# Patient Record
Sex: MALE | Race: OTHER | NOT HISPANIC OR LATINO
[De-identification: names, ages, dates, MRNs, and addresses within clinical notes are randomized per-mention and may not be internally consistent; named-entity substitution may affect disease eponyms.]

---

## 2017-05-01 ENCOUNTER — APPOINTMENT (OUTPATIENT)
Dept: ORTHOPEDIC SURGERY | Facility: CLINIC | Age: 82
End: 2017-05-01

## 2017-09-18 ENCOUNTER — APPOINTMENT (OUTPATIENT)
Dept: ORTHOPEDIC SURGERY | Facility: CLINIC | Age: 82
End: 2017-09-18
Payer: MEDICARE

## 2017-09-18 PROCEDURE — 99213 OFFICE O/P EST LOW 20 MIN: CPT

## 2017-10-07 ENCOUNTER — INPATIENT (INPATIENT)
Facility: HOSPITAL | Age: 82
LOS: 4 days | Discharge: HOME | DRG: 292 | End: 2017-10-12
Attending: INTERNAL MEDICINE | Admitting: INTERNAL MEDICINE
Payer: MEDICARE

## 2017-10-07 VITALS
TEMPERATURE: 98 F | HEART RATE: 77 BPM | RESPIRATION RATE: 20 BRPM | WEIGHT: 134.04 LBS | OXYGEN SATURATION: 96 % | SYSTOLIC BLOOD PRESSURE: 161 MMHG | DIASTOLIC BLOOD PRESSURE: 89 MMHG

## 2017-10-07 DIAGNOSIS — Z98.89 OTHER SPECIFIED POSTPROCEDURAL STATES: Chronic | ICD-10-CM

## 2017-10-07 DIAGNOSIS — I25.10 ATHEROSCLEROTIC HEART DISEASE OF NATIVE CORONARY ARTERY WITHOUT ANGINA PECTORIS: ICD-10-CM

## 2017-10-07 DIAGNOSIS — I50.23 ACUTE ON CHRONIC SYSTOLIC (CONGESTIVE) HEART FAILURE: ICD-10-CM

## 2017-10-07 DIAGNOSIS — I10 ESSENTIAL (PRIMARY) HYPERTENSION: ICD-10-CM

## 2017-10-07 DIAGNOSIS — I50.9 HEART FAILURE, UNSPECIFIED: ICD-10-CM

## 2017-10-07 DIAGNOSIS — I48.2 CHRONIC ATRIAL FIBRILLATION: ICD-10-CM

## 2017-10-07 DIAGNOSIS — E78.5 HYPERLIPIDEMIA, UNSPECIFIED: ICD-10-CM

## 2017-10-07 LAB
ALBUMIN SERPL ELPH-MCNC: 4 G/DL — SIGNIFICANT CHANGE UP (ref 3.3–5)
ALP SERPL-CCNC: 49 U/L — SIGNIFICANT CHANGE UP (ref 40–120)
ALT FLD-CCNC: 27 U/L RC — SIGNIFICANT CHANGE UP (ref 10–45)
ANION GAP SERPL CALC-SCNC: 11 MMOL/L — SIGNIFICANT CHANGE UP (ref 5–17)
APTT BLD: 28.6 SEC — SIGNIFICANT CHANGE UP (ref 27.5–37.4)
AST SERPL-CCNC: 28 U/L — SIGNIFICANT CHANGE UP (ref 10–40)
BASOPHILS # BLD AUTO: 0 K/UL — SIGNIFICANT CHANGE UP (ref 0–0.2)
BASOPHILS NFR BLD AUTO: 0.4 % — SIGNIFICANT CHANGE UP (ref 0–2)
BILIRUB SERPL-MCNC: 0.3 MG/DL — SIGNIFICANT CHANGE UP (ref 0.2–1.2)
BUN SERPL-MCNC: 22 MG/DL — SIGNIFICANT CHANGE UP (ref 7–23)
CALCIUM SERPL-MCNC: 9.4 MG/DL — SIGNIFICANT CHANGE UP (ref 8.4–10.5)
CHLORIDE SERPL-SCNC: 98 MMOL/L — SIGNIFICANT CHANGE UP (ref 96–108)
CK MB CFR SERPL CALC: 4.2 NG/ML — SIGNIFICANT CHANGE UP (ref 0–6.7)
CO2 SERPL-SCNC: 28 MMOL/L — SIGNIFICANT CHANGE UP (ref 22–31)
CREAT SERPL-MCNC: 1.03 MG/DL — SIGNIFICANT CHANGE UP (ref 0.5–1.3)
EOSINOPHIL # BLD AUTO: 0.4 K/UL — SIGNIFICANT CHANGE UP (ref 0–0.5)
EOSINOPHIL NFR BLD AUTO: 4.2 % — SIGNIFICANT CHANGE UP (ref 0–6)
GAS PNL BLDV: SIGNIFICANT CHANGE UP
GLUCOSE SERPL-MCNC: 148 MG/DL — HIGH (ref 70–99)
HCT VFR BLD CALC: 41.1 % — SIGNIFICANT CHANGE UP (ref 39–50)
HGB BLD-MCNC: 14.6 G/DL — SIGNIFICANT CHANGE UP (ref 13–17)
INR BLD: 1.19 RATIO — HIGH (ref 0.88–1.16)
LYMPHOCYTES # BLD AUTO: 2.8 K/UL — SIGNIFICANT CHANGE UP (ref 1–3.3)
LYMPHOCYTES # BLD AUTO: 33.4 % — SIGNIFICANT CHANGE UP (ref 13–44)
MCHC RBC-ENTMCNC: 32.1 PG — SIGNIFICANT CHANGE UP (ref 27–34)
MCHC RBC-ENTMCNC: 35.4 GM/DL — SIGNIFICANT CHANGE UP (ref 32–36)
MCV RBC AUTO: 90.5 FL — SIGNIFICANT CHANGE UP (ref 80–100)
MONOCYTES # BLD AUTO: 0.8 K/UL — SIGNIFICANT CHANGE UP (ref 0–0.9)
MONOCYTES NFR BLD AUTO: 9.2 % — SIGNIFICANT CHANGE UP (ref 2–14)
NEUTROPHILS # BLD AUTO: 4.5 K/UL — SIGNIFICANT CHANGE UP (ref 1.8–7.4)
NEUTROPHILS NFR BLD AUTO: 52.8 % — SIGNIFICANT CHANGE UP (ref 43–77)
NT-PROBNP SERPL-SCNC: 2452 PG/ML — HIGH (ref 0–300)
PLATELET # BLD AUTO: 229 K/UL — SIGNIFICANT CHANGE UP (ref 150–400)
POTASSIUM SERPL-MCNC: 4.1 MMOL/L — SIGNIFICANT CHANGE UP (ref 3.5–5.3)
POTASSIUM SERPL-SCNC: 4.1 MMOL/L — SIGNIFICANT CHANGE UP (ref 3.5–5.3)
PROT SERPL-MCNC: 7.5 G/DL — SIGNIFICANT CHANGE UP (ref 6–8.3)
PROTHROM AB SERPL-ACNC: 12.9 SEC — HIGH (ref 9.8–12.7)
RBC # BLD: 4.54 M/UL — SIGNIFICANT CHANGE UP (ref 4.2–5.8)
RBC # FLD: 11.6 % — SIGNIFICANT CHANGE UP (ref 10.3–14.5)
SODIUM SERPL-SCNC: 137 MMOL/L — SIGNIFICANT CHANGE UP (ref 135–145)
TROPONIN T SERPL-MCNC: <0.01 NG/ML — SIGNIFICANT CHANGE UP (ref 0–0.06)
WBC # BLD: 8.5 K/UL — SIGNIFICANT CHANGE UP (ref 3.8–10.5)
WBC # FLD AUTO: 8.5 K/UL — SIGNIFICANT CHANGE UP (ref 3.8–10.5)

## 2017-10-07 PROCEDURE — 93010 ELECTROCARDIOGRAM REPORT: CPT

## 2017-10-07 PROCEDURE — 99285 EMERGENCY DEPT VISIT HI MDM: CPT | Mod: 25

## 2017-10-07 PROCEDURE — 71010: CPT | Mod: 26

## 2017-10-07 RX ORDER — ASPIRIN/CALCIUM CARB/MAGNESIUM 324 MG
81 TABLET ORAL DAILY
Qty: 0 | Refills: 0 | Status: DISCONTINUED | OUTPATIENT
Start: 2017-10-07 | End: 2017-10-12

## 2017-10-07 RX ORDER — AMLODIPINE BESYLATE 2.5 MG/1
10 TABLET ORAL DAILY
Qty: 0 | Refills: 0 | Status: DISCONTINUED | OUTPATIENT
Start: 2017-10-07 | End: 2017-10-12

## 2017-10-07 RX ORDER — FUROSEMIDE 40 MG
20 TABLET ORAL ONCE
Qty: 0 | Refills: 0 | Status: COMPLETED | OUTPATIENT
Start: 2017-10-07 | End: 2017-10-07

## 2017-10-07 RX ORDER — ENOXAPARIN SODIUM 100 MG/ML
60 INJECTION SUBCUTANEOUS
Qty: 0 | Refills: 0 | Status: DISCONTINUED | OUTPATIENT
Start: 2017-10-07 | End: 2017-10-11

## 2017-10-07 RX ORDER — LISINOPRIL 2.5 MG/1
10 TABLET ORAL DAILY
Qty: 0 | Refills: 0 | Status: DISCONTINUED | OUTPATIENT
Start: 2017-10-07 | End: 2017-10-12

## 2017-10-07 RX ORDER — ASPIRIN/CALCIUM CARB/MAGNESIUM 324 MG
1 TABLET ORAL
Qty: 0 | Refills: 0 | COMMUNITY

## 2017-10-07 RX ORDER — LISINOPRIL 2.5 MG/1
1 TABLET ORAL
Qty: 0 | Refills: 0 | COMMUNITY

## 2017-10-07 RX ORDER — MONTELUKAST 4 MG/1
1 TABLET, CHEWABLE ORAL
Qty: 0 | Refills: 0 | COMMUNITY

## 2017-10-07 RX ORDER — METOPROLOL TARTRATE 50 MG
50 TABLET ORAL
Qty: 0 | Refills: 0 | Status: DISCONTINUED | OUTPATIENT
Start: 2017-10-07 | End: 2017-10-12

## 2017-10-07 RX ORDER — ALBUTEROL 90 UG/1
2 AEROSOL, METERED ORAL
Qty: 0 | Refills: 0 | COMMUNITY

## 2017-10-07 RX ORDER — METOPROLOL TARTRATE 50 MG
1 TABLET ORAL
Qty: 0 | Refills: 0 | COMMUNITY

## 2017-10-07 RX ORDER — POLYETHYLENE GLYCOL 3350 17 G/17G
17 POWDER, FOR SOLUTION ORAL
Qty: 0 | Refills: 0 | COMMUNITY

## 2017-10-07 RX ORDER — INFLUENZA VIRUS VACCINE 15; 15; 15; 15 UG/.5ML; UG/.5ML; UG/.5ML; UG/.5ML
0.5 SUSPENSION INTRAMUSCULAR ONCE
Qty: 0 | Refills: 0 | Status: DISCONTINUED | OUTPATIENT
Start: 2017-10-07 | End: 2017-10-12

## 2017-10-07 RX ORDER — AMLODIPINE BESYLATE 2.5 MG/1
1 TABLET ORAL
Qty: 0 | Refills: 0 | COMMUNITY

## 2017-10-07 RX ORDER — CHOLECALCIFEROL (VITAMIN D3) 125 MCG
1 CAPSULE ORAL
Qty: 0 | Refills: 0 | COMMUNITY

## 2017-10-07 RX ORDER — MELOXICAM 15 MG/1
1 TABLET ORAL
Qty: 0 | Refills: 0 | COMMUNITY

## 2017-10-07 RX ORDER — TETRAHYDROZOLINE/POLYETHYL GLY 0.05 %-1 %
1 DROPS OPHTHALMIC (EYE)
Qty: 0 | Refills: 0 | COMMUNITY

## 2017-10-07 RX ADMIN — ENOXAPARIN SODIUM 60 MILLIGRAM(S): 100 INJECTION SUBCUTANEOUS at 22:01

## 2017-10-07 RX ADMIN — Medication 50 MILLIGRAM(S): at 22:01

## 2017-10-07 RX ADMIN — Medication 20 MILLIGRAM(S): at 17:27

## 2017-10-07 RX ADMIN — Medication 20 MILLIGRAM(S): at 16:34

## 2017-10-07 NOTE — ED PROVIDER NOTE - ATTENDING CONTRIBUTION TO CARE
Dr. Robles : I have personally seen and examined this patient at the bedside. I have fully participated in the care of this patient. I have reviewed all pertinent clinical information, including history, physical exam, plan and the Resident's note and agree except as noted.   83yo M hx of CHF, htn mi cad p/w bl le swelling, sob worsening over last 4 days. notes that usually is active walks for 45-1hr daily and could not do it over last few days as gets sob.   Denies f/c/n/v/ cp/ palpitations/abd.pain/d/c/dysuria/hematuria. no sick contacts/recent travel. +orthopnea    PE:  head; atraumatic normocephalic  eyes: perrla  Heart: rrr s1s2  lungs: ctab  abd: soft, nt nd + bs no rebound/guarding no cva ttp  le: 1+edema bl no calf ttp  back: no midline cervical/thoracic/lumbar ttp    -->most likely chf exacerbation vs acs vs pna will fu labs cxr; tx with lasix--admit

## 2017-10-07 NOTE — H&P ADULT - PROBLEM SELECTOR PLAN 1
S/P IV Lasix 60 mg and will start 40mg Iv BID . Rpting Echo as last echo showed  EF 34% ;< from: Transthoracic Echocardiogram (08.28.16 @ 18:16) >  1. Mild mitral regurgitation.  2. Calcified trileaflet aortic valve with normal opening.  Mild aortic regurgitation.  3. Endocardium not well visualized; moderately  reduced  left ventricular systolic function, however, endocardial  border definition is limited, and segmental wall-motion  abnormalities assessment is limited.Consider further  limited evaluation with echo contrast Definity for  assessment of segmental wall motion if clinically  indicated.  4. The right ventricle is not well visualized; grossly  normal right ventricular systolic function.  5. Estimated pulmonary artery systolic pressure equals 35  mm Hg, assuming right atrial pressure equals 8 mm Hg,  consistent with borderline pulmonary pressures.    Cardiology consulted.

## 2017-10-07 NOTE — ED ADULT NURSE NOTE - OBJECTIVE STATEMENT
as per son, patient was sent to ER by his doctor for increasing shortness of breath on exertion and BLE edema. At rest patient appears mildly short of breath. Abdomen appears distended which patient states is not normal for him. Denies any chest pain, cough, fever or chills. Reports he takes lasix 40 mg daily and was instructed to double his dose by his doctor but has not had improvement of symptoms even though he has been urinating large amounts.

## 2017-10-07 NOTE — H&P ADULT - PROBLEM SELECTOR PLAN 2
Stopped taking Eliquis as was making him sick but no bleeding. Will change to Xarelto . Echo ordered . EP consulted.

## 2017-10-07 NOTE — ED PROVIDER NOTE - CARDIAC, MLM
Normal rate, regular rhythm.  Heart sounds S1, S2.  No murmurs, rubs or gallops. 1+ pitting edema b/l lower extremities.

## 2017-10-07 NOTE — ED PROVIDER NOTE - OBJECTIVE STATEMENT
83 y/o M pmhx CHF, HTN, HLD, LBBB, old MI, CAD, presenting sent in by his PMD for worsening shortness of breath. Pt states that he has been having worsening shortness of breath with exertion for the last week, today was severe to extent that he was unable to walk more than a few steps without being short of breath. Son states that he took him to his doctor and was told to bring him in for CHF exacerbation. Patient states that he has noted swelling in his legs bilaterally that is worsening for the last few days as well. Took 40mg of lasix this morning, normal dose is 20mg once daily. Reports this has not improved his symptoms. Pt denies any chest pain, fever, chills, nausea, vomiting, diarrhea, recent travel, recent sick contacts.

## 2017-10-07 NOTE — H&P ADULT - HISTORY OF PRESENT ILLNESS
81 y/o M pmhx CHF, HTN, HLD, LBBB, old MI, CAD, presenting sent in by his PMD for worsening shortness of breath. Pt states that he has been having worsening shortness of breath with exertion for the last week, today was severe to extent that he was unable to walk more than a few steps without being short of breath. Son states that he took him to his doctor and was told to bring him in for CHF exacerbation. Patient states that he has noted swelling in his legs bilaterally that is worsening for the last few days as well. Took 40mg of lasix this morning, normal dose is 20mg once daily. Reports this has not improved his symptoms. Pt denies any chest pain, fever, chills, nausea, vomiting, diarrhea, recent travel, recent sick contacts.

## 2017-10-07 NOTE — ED PROVIDER NOTE - MEDICAL DECISION MAKING DETAILS
81 y/o M significant cardiac hsitory with h/o CHF presenting with worsening HENAO x1 week, now with b/l LE edema, sent in by PMD for likely CHF exacerbation. PE remarkable for wheezing and LE edema. Will obtain labs, pro-BNP, CXR, provide lasix and TBA for CHF

## 2017-10-07 NOTE — H&P ADULT - FAMILY HISTORY
Sibling  Still living? Unknown  Family history of acute myocardial infarction, Age at diagnosis: Age Unknown

## 2017-10-07 NOTE — ED ADULT NURSE NOTE - PMH
Acid reflux    CHF (congestive heart failure)    Coronary artery disease involving native coronary artery of native heart, angina presence unspecified    HTN (hypertension)    Hyperlipidemia, unspecified hyperlipidemia type    Hypertension    Left bundle branch block    Localized, primary osteoarthritis  left knee  MI, old  questionable 1970's  Primary osteoarthritis, unspecified site    Uncomplicated asthma, unspecified asthma severity

## 2017-10-08 DIAGNOSIS — J45.909 UNSPECIFIED ASTHMA, UNCOMPLICATED: ICD-10-CM

## 2017-10-08 DIAGNOSIS — R06.02 SHORTNESS OF BREATH: ICD-10-CM

## 2017-10-08 LAB
ANION GAP SERPL CALC-SCNC: 13 MMOL/L — SIGNIFICANT CHANGE UP (ref 5–17)
BUN SERPL-MCNC: 17 MG/DL — SIGNIFICANT CHANGE UP (ref 7–23)
CALCIUM SERPL-MCNC: 9.4 MG/DL — SIGNIFICANT CHANGE UP (ref 8.4–10.5)
CHLORIDE SERPL-SCNC: 100 MMOL/L — SIGNIFICANT CHANGE UP (ref 96–108)
CO2 SERPL-SCNC: 27 MMOL/L — SIGNIFICANT CHANGE UP (ref 22–31)
CREAT SERPL-MCNC: 0.97 MG/DL — SIGNIFICANT CHANGE UP (ref 0.5–1.3)
GLUCOSE SERPL-MCNC: 116 MG/DL — HIGH (ref 70–99)
HCT VFR BLD CALC: 40 % — SIGNIFICANT CHANGE UP (ref 39–50)
HGB BLD-MCNC: 14.5 G/DL — SIGNIFICANT CHANGE UP (ref 13–17)
MCHC RBC-ENTMCNC: 30.9 PG — SIGNIFICANT CHANGE UP (ref 27–34)
MCHC RBC-ENTMCNC: 36.3 GM/DL — HIGH (ref 32–36)
MCV RBC AUTO: 85.3 FL — SIGNIFICANT CHANGE UP (ref 80–100)
PLATELET # BLD AUTO: 214 K/UL — SIGNIFICANT CHANGE UP (ref 150–400)
POTASSIUM SERPL-MCNC: 3.3 MMOL/L — LOW (ref 3.5–5.3)
POTASSIUM SERPL-SCNC: 3.3 MMOL/L — LOW (ref 3.5–5.3)
RBC # BLD: 4.69 M/UL — SIGNIFICANT CHANGE UP (ref 4.2–5.8)
RBC # FLD: 13.2 % — SIGNIFICANT CHANGE UP (ref 10.3–14.5)
SODIUM SERPL-SCNC: 140 MMOL/L — SIGNIFICANT CHANGE UP (ref 135–145)
WBC # BLD: 6.46 K/UL — SIGNIFICANT CHANGE UP (ref 3.8–10.5)
WBC # FLD AUTO: 6.46 K/UL — SIGNIFICANT CHANGE UP (ref 3.8–10.5)

## 2017-10-08 RX ORDER — FUROSEMIDE 40 MG
60 TABLET ORAL DAILY
Qty: 0 | Refills: 0 | Status: DISCONTINUED | OUTPATIENT
Start: 2017-10-08 | End: 2017-10-12

## 2017-10-08 RX ORDER — MONTELUKAST 4 MG/1
10 TABLET, CHEWABLE ORAL DAILY
Qty: 0 | Refills: 0 | Status: DISCONTINUED | OUTPATIENT
Start: 2017-10-08 | End: 2017-10-12

## 2017-10-08 RX ORDER — SENNA PLUS 8.6 MG/1
2 TABLET ORAL AT BEDTIME
Qty: 0 | Refills: 0 | Status: DISCONTINUED | OUTPATIENT
Start: 2017-10-08 | End: 2017-10-12

## 2017-10-08 RX ORDER — POTASSIUM CHLORIDE 20 MEQ
40 PACKET (EA) ORAL EVERY 4 HOURS
Qty: 0 | Refills: 0 | Status: COMPLETED | OUTPATIENT
Start: 2017-10-08 | End: 2017-10-08

## 2017-10-08 RX ORDER — IPRATROPIUM/ALBUTEROL SULFATE 18-103MCG
3 AEROSOL WITH ADAPTER (GRAM) INHALATION EVERY 6 HOURS
Qty: 0 | Refills: 0 | Status: DISCONTINUED | OUTPATIENT
Start: 2017-10-08 | End: 2017-10-12

## 2017-10-08 RX ORDER — DOCUSATE SODIUM 100 MG
100 CAPSULE ORAL THREE TIMES A DAY
Qty: 0 | Refills: 0 | Status: DISCONTINUED | OUTPATIENT
Start: 2017-10-08 | End: 2017-10-12

## 2017-10-08 RX ORDER — BUDESONIDE AND FORMOTEROL FUMARATE DIHYDRATE 160; 4.5 UG/1; UG/1
2 AEROSOL RESPIRATORY (INHALATION)
Qty: 0 | Refills: 0 | Status: DISCONTINUED | OUTPATIENT
Start: 2017-10-08 | End: 2017-10-12

## 2017-10-08 RX ADMIN — SENNA PLUS 2 TABLET(S): 8.6 TABLET ORAL at 21:33

## 2017-10-08 RX ADMIN — Medication 100 MILLIGRAM(S): at 21:33

## 2017-10-08 RX ADMIN — Medication 60 MILLIGRAM(S): at 12:34

## 2017-10-08 RX ADMIN — Medication 40 MILLIEQUIVALENT(S): at 19:56

## 2017-10-08 RX ADMIN — LISINOPRIL 10 MILLIGRAM(S): 2.5 TABLET ORAL at 06:24

## 2017-10-08 RX ADMIN — Medication 3 MILLILITER(S): at 21:33

## 2017-10-08 RX ADMIN — ENOXAPARIN SODIUM 60 MILLIGRAM(S): 100 INJECTION SUBCUTANEOUS at 12:34

## 2017-10-08 RX ADMIN — Medication 81 MILLIGRAM(S): at 12:35

## 2017-10-08 RX ADMIN — Medication 40 MILLIEQUIVALENT(S): at 16:59

## 2017-10-08 RX ADMIN — ENOXAPARIN SODIUM 60 MILLIGRAM(S): 100 INJECTION SUBCUTANEOUS at 21:34

## 2017-10-08 RX ADMIN — Medication 50 MILLIGRAM(S): at 06:24

## 2017-10-08 RX ADMIN — AMLODIPINE BESYLATE 10 MILLIGRAM(S): 2.5 TABLET ORAL at 06:24

## 2017-10-08 RX ADMIN — BUDESONIDE AND FORMOTEROL FUMARATE DIHYDRATE 2 PUFF(S): 160; 4.5 AEROSOL RESPIRATORY (INHALATION) at 19:58

## 2017-10-08 RX ADMIN — MONTELUKAST 10 MILLIGRAM(S): 4 TABLET, CHEWABLE ORAL at 17:02

## 2017-10-08 RX ADMIN — Medication 50 MILLIGRAM(S): at 17:02

## 2017-10-08 NOTE — PROGRESS NOTE ADULT - SUBJECTIVE AND OBJECTIVE BOX
INTERVAL HPI/OVERNIGHT EVENTS: Feel better .   Vital Signs Last 24 Hrs  T(C): 36.4 (08 Oct 2017 04:26), Max: 37.1 (07 Oct 2017 21:48)  T(F): 97.5 (08 Oct 2017 04:26), Max: 98.8 (07 Oct 2017 21:48)  HR: 76 (08 Oct 2017 04:26) (66 - 82)  BP: 165/77 (08 Oct 2017 04:26) (147/84 - 166/88)  BP(mean): --  RR: 18 (08 Oct 2017 04:26) (18 - 20)  SpO2: 93% (08 Oct 2017 04:26) (93% - 97%)  I&O's Summary    07 Oct 2017 07:01  -  08 Oct 2017 07:00  --------------------------------------------------------  IN: 0 mL / OUT: 4500 mL / NET: -4500 mL      MEDICATIONS  (STANDING):  amLODIPine   Tablet 10 milliGRAM(s) Oral daily  aspirin enteric coated 81 milliGRAM(s) Oral daily  enoxaparin Injectable 60 milliGRAM(s) SubCutaneous two times a day  influenza   Vaccine 0.5 milliLiter(s) IntraMuscular once  lisinopril 10 milliGRAM(s) Oral daily  metoprolol 50 milliGRAM(s) Oral two times a day    MEDICATIONS  (PRN):    LABS:                        14.5   6.46  )-----------( 214      ( 08 Oct 2017 09:28 )             40.0     10-08    140  |  100  |  17  ----------------------------<  116<H>  3.3<L>   |  27  |  0.97    Ca    9.4      08 Oct 2017 09:19    TPro  7.5  /  Alb  4.0  /  TBili  0.3  /  DBili  x   /  AST  28  /  ALT  27  /  AlkPhos  49  10-07    PT/INR - ( 07 Oct 2017 16:25 )   PT: 12.9 sec;   INR: 1.19 ratio         PTT - ( 07 Oct 2017 16:25 )  PTT:28.6 sec    CAPILLARY BLOOD GLUCOSE              REVIEW OF SYSTEMS:  CONSTITUTIONAL: No fever, weight loss, or fatigue  EYES: No eye pain, visual disturbances, or discharge  ENMT:  No difficulty hearing, tinnitus, vertigo; No sinus or throat pain  NECK: No pain or stiffness  BREASTS: No pain, masses, or nipple discharge  RESPIRATORY: No cough, wheezing, chills or hemoptysis; No shortness of breath  CARDIOVASCULAR: No chest pain, palpitations, dizziness, or leg swelling  GASTROINTESTINAL: No abdominal or epigastric pain. No nausea, vomiting, or hematemesis; No diarrhea or constipation. No melena or hematochezia.  GENITOURINARY: No dysuria, frequency, hematuria, or incontinence  NEUROLOGICAL: No headaches, memory loss, loss of strength, numbness, or tremors  SKIN: No itching, burning, rashes, or lesions   LYMPH NODES: No enlarged glands  ENDOCRINE: No heat or cold intolerance; No hair loss  MUSCULOSKELETAL: No joint pain or swelling; No muscle, back, or extremity pain  PSYCHIATRIC: No depression, anxiety, mood swings, or difficulty sleeping  HEME/LYMPH: No easy bruising, or bleeding gums  ALLERY AND IMMUNOLOGIC: No hives or eczema    RADIOLOGY & ADDITIONAL TESTS:    Consultant(s) Notes Reviewed:  [x ] YES  [ ] NO    PHYSICAL EXAM:  GENERAL: NAD, well-groomed, well-developed, not in any distress ,  HEAD:  Atraumatic, Normocephalic  EYES: EOMI, PERRLA, conjunctiva and sclera clear  ENMT: No tonsillar erythema, exudates, or enlargement; Moist mucous membranes, Good dentition, No lesions  NECK: Supple, No JVD, Normal thyroid  NERVOUS SYSTEM:  Alert & Oriented X3, No focal deficit   CHEST/LUNG: Good air entry bilateral with  rales, rhonchi,   HEART: Iregular rate and rhythm; No murmurs, rubs, or gallops  ABDOMEN: Soft, Nontender, Nondistended; Bowel sounds present  EXTREMITIES:  2+ Peripheral Pulses, No clubbing, cyanosis, or +edema  SKIN: No rashes or lesions    Care Discussed with Consultants/Other Providers [ x] YES  [ ] NO

## 2017-10-08 NOTE — CONSULT NOTE ADULT - SUBJECTIVE AND OBJECTIVE BOX
I have seen and examined the patient and reviewed the history He says he has hx of asthma and was taking " rotahaler" in [place] He could not specify what kind of inhaler! He has been having SOB for past few days with increasing edema of legs .  Patient is a 82y old  Male who presents with a chief complaint of sob (07 Oct 2017 19:25)      HPI:  81 y/o M pmhx CHF, HTN, HLD, LBBB, old MI, CAD, presenting sent in by his PMD for worsening shortness of breath. Pt states that he has been having worsening shortness of breath with exertion for the last week, today was severe to extent that he was unable to walk more than a few steps without being short of breath. Son states that he took him to his doctor and was told to bring him in for CHF exacerbation. Patient states that he has noted swelling in his legs bilaterally that is worsening for the last few days as well. Took 40mg of lasix this morning, normal dose is 20mg once daily. Reports this has not improved his symptoms. Pt denies any chest pain, fever, chills, nausea, vomiting, diarrhea, recent travel, recent sick contacts. (07 Oct 2017 19:25)      ?FOLLOWING PRESENT  [x ] Hx of PE/DVT, [x] Hx COPD, [y ] Hx of Asthma, [ ?] Hx of Hospitalization, [x]  Hx of BiPAP/CPAP use, [ x] Hx of SERGEI    Allergies    No Known Allergies    Intolerances        PAST MEDICAL & SURGICAL HISTORY:  CHF (congestive heart failure)  Hyperlipidemia, unspecified hyperlipidemia type  Primary osteoarthritis, unspecified site  Uncomplicated asthma, unspecified asthma severity  Coronary artery disease involving native coronary artery of native heart, angina presence unspecified  Hypertension  Left bundle branch block  Localized, primary osteoarthritis: left knee  MI, old: questionable 1970&#x27;s  Acid reflux  HTN (hypertension)  H/O hernia repair  Hernia, umbilical: S/P repair 2003      FAMILY HISTORY:  Family history of acute myocardial infarction (Sibling)      Social History: [ x ] TOBACCO                  [ occ ] ETOH                                 [x  ] IVDA/DRUGS    REVIEW OF SYSTEMS      General:x	    Skin/Breast:x  	  Ophthalmologic:x  	  ENMT:	x    Respiratory and Thorax: sob  	  Cardiovascular:	x    Gastrointestinal:	x    Genitourinary:	x    Musculoskeletal:	 pedal edema     Neurological:	x    Psychiatric:	x    Hematology/Lymphatics:	x    Endocrine:	x    Allergic/Immunologic:	  x  MEDICATIONS  (STANDING):  amLODIPine   Tablet 10 milliGRAM(s) Oral daily  aspirin enteric coated 81 milliGRAM(s) Oral daily  enoxaparin Injectable 60 milliGRAM(s) SubCutaneous two times a day  furosemide   Injectable 60 milliGRAM(s) IV Push daily  influenza   Vaccine 0.5 milliLiter(s) IntraMuscular once  lisinopril 10 milliGRAM(s) Oral daily  metoprolol 50 milliGRAM(s) Oral two times a day    MEDICATIONS  (PRN):       Vital Signs Last 24 Hrs  T(C): 36.4 (08 Oct 2017 04:26), Max: 37.1 (07 Oct 2017 21:48)  T(F): 97.5 (08 Oct 2017 04:26), Max: 98.8 (07 Oct 2017 21:48)  HR: 76 (08 Oct 2017 04:26) (66 - 82)  BP: 165/77 (08 Oct 2017 04:26) (147/84 - 166/88)  BP(mean): --  RR: 18 (08 Oct 2017 04:26) (18 - 20)  SpO2: 93% (08 Oct 2017 04:26) (93% - 97%)        I&O's Summary    07 Oct 2017 07:01  -  08 Oct 2017 07:00  --------------------------------------------------------  IN: 0 mL / OUT: 4500 mL / NET: -4500 mL        Physical Exam:   GENERAL: NAD, well-groomed, well-developed  HEENT: TONY/   Atraumatic, Normocephalic  ENMT: No tonsillar erythema, exudates, or enlargement; Moist mucous membranes, Good dentition, No lesions  NECK: Supple, No JVD, Normal thyroid  CHEST/LUNG: half way coarse crackels onm left side right side is clean   CVS: Regular rate and rhythm; No murmurs, rubs, or gallops  GI: : Soft, Nontender, Nondistended; Bowel sounds present  NERVOUS SYSTEM:  Alert & Oriented X3  EXTREMITIES: mild edema  LYMPH: No lymphadenopathy noted  SKIN: No rashes or lesions  ENDOCRINOLOGY: No Thyromegaly  PSYCH: Appropriate    Labs:  3.8<54<4>>42<<7.365>>3.8<<3><<4><<5<<429>>  CARDIAC MARKERS ( 07 Oct 2017 16:25 )  x     / <0.01 ng/mL / x     / x     / 4.2 ng/mL                            14.5   6.46  )-----------( 214      ( 08 Oct 2017 09:28 )             40.0                         14.6   8.5   )-----------( 229      ( 07 Oct 2017 16:25 )             41.1     10-08    140  |  100  |  17  ----------------------------<  116<H>  3.3<L>   |  27  |  0.97  10-07    137  |  98  |  22  ----------------------------<  148<H>  4.1   |  28  |  1.03    Ca    9.4      08 Oct 2017 09:19  Ca    9.4      07 Oct 2017 16:25    TPro  7.5  /  Alb  4.0  /  TBili  0.3  /  DBili  x   /  AST  28  /  ALT  27  /  AlkPhos  49  10-07    CAPILLARY BLOOD GLUCOSE        LIVER FUNCTIONS - ( 07 Oct 2017 16:25 )  Alb: 4.0 g/dL / Pro: 7.5 g/dL / ALK PHOS: 49 U/L / ALT: 27 U/L RC / AST: 28 U/L / GGT: x           PT/INR - ( 07 Oct 2017 16:25 )   PT: 12.9 sec;   INR: 1.19 ratio         PTT - ( 07 Oct 2017 16:25 )  PTT:28.6 sec    D DImer  Serum Pro-Brain Natriuretic Peptide: 2452 pg/mL (10-07 @ 16:25)    Cultures:               < from: Xray Chest 1 View AP- PORTABLE-Urgent (10.07.17 @ 16:52) >      PROCEDURE DATE:  10/07/2017            INTERPRETATION:  CLINICAL INDICATION: Dyspnea on exertion, history of CHF.    EXAM: Frontal view of the chest with comparison made to chest radiograph   on 8/25/2016    FINDINGS:   Bibasilar opacities. Hilar vasculature is indistinct. No pneumothorax.  The heart size is slightly enlarged.  No acute bony pathology.    IMPRESSION:   Mild pulmonary edema with small bilateral pleural effusions.      < from: Xray Chest 2 Views PA/Lat (10.22.13 @ 14:35) >  CHEST PA LAT        PROCEDURE DATE:  Oct 22 2013       INTERPRETATION:  CLINICAL INDICATION: Preoperative x-rays    EXAM: PA and lateral radiographs of the chest.    COMPARISON: There are no similar prior studies available for comparison.     FINDINGS:  The lungs are grossly clear. There are no pleural effusions. There is no   evidence of pneumothorax.    The cardiac and mediastinal silhouettes are within normal limits.    There are degenerative changes of the spine.    IMPRESSION: No acute pulmonary disease.             TAE MCKINNON M.D., RESIDENT RADIOLOGIST  DUSTIN TONY M.D., ATTENDING RADIOLOGIST  This examination was interpreted on:  Oct 22 2013  3:17P.  This document   has been electronically signed. Oct 22 2013  3:21P.          < end of copied text >            ALLEN TONEY M.D., RADIOLOGY RESIDENT  This document has been electronically signed.  AGUSTIN RODAS M.D., ATTENDING RADIOLOGIST  This document has been electronically signed. Oct  8 2017  8:48AM        < end of copied text >              Studies  Chest X-RAY  CT SCAN Chest   CT Abdomen  Venous Dopplers: LE:   Others

## 2017-10-08 NOTE — CONSULT NOTE ADULT - SUBJECTIVE AND OBJECTIVE BOX
HISTORY OF PRESENT ILLNESS: HPI:  81 y/o M pmhx CHF, HTN, HLD, LBBB, old MI, CAD, Moderate LV dysfx, reportedly cathed 5 years ago in [place] and medical managment was suggested who now is sent in by his PMD for worsening shortness of breath. Pt states that he has been having worsening shortness of breath with exertion for the last week, today was severe to extent that he was unable to walk more than a few steps without being short of breath. Son states that he took him to his doctor and was told to bring him in for CHF exacerbation. Patient states that he has noted swelling in his legs bilaterally that is worsening for the last few days as well. Took 40mg of lasix this morning, normal dose is 20mg once daily. Reports this has not improved his symptoms. Pt denies any chest pain, fever, chills, nausea, vomiting, diarrhea, recent travel, recent sick contacts. (07 Oct 2017 19:25) Does admit dietary indiscretion this week as they have been celebrating a wedding in the family.  He is now noted to be in afib      PAST MEDICAL & SURGICAL HISTORY:  CHF (congestive heart failure)  Hyperlipidemia, unspecified hyperlipidemia type  Primary osteoarthritis, unspecified site  Uncomplicated asthma, unspecified asthma severity  Coronary artery disease involving native coronary artery of native heart, angina presence unspecified  Hypertension  Left bundle branch block  Localized, primary osteoarthritis: left knee  MI, old: questionable 1970&#x27;s  Acid reflux  HTN (hypertension)  H/O hernia repair  Hernia, umbilical: S/P repair 2003          MEDICATIONS:  MEDICATIONS  (STANDING):  amLODIPine   Tablet 10 milliGRAM(s) Oral daily  aspirin enteric coated 81 milliGRAM(s) Oral daily  buDESOnide 160 MICROgram(s)/formoterol 4.5 MICROgram(s) Inhaler 2 Puff(s) Inhalation two times a day  enoxaparin Injectable 60 milliGRAM(s) SubCutaneous two times a day  furosemide   Injectable 60 milliGRAM(s) IV Push daily  influenza   Vaccine 0.5 milliLiter(s) IntraMuscular once  lisinopril 10 milliGRAM(s) Oral daily  metoprolol 50 milliGRAM(s) Oral two times a day  montelukast 10 milliGRAM(s) Oral daily  potassium chloride    Tablet ER 40 milliEquivalent(s) Oral every 4 hours      Allergies    No Known Allergies    Intolerances        FAMILY HISTORY:  Family history of acute myocardial infarction (Sibling)    Non-contributary for premature coronary disease or sudden cardiac death    SOCIAL HISTORY:    [X ] Non-smoker  [ ] Smoker  [ ] Alcohol      REVIEW OF SYSTEMS:  [ ]chest pain  [ X ]shortness of breath  [  ]palpitations  [  ]syncope  [ ]near syncope [ ]upper extremity weakness   [ ] lower extremity weakness  [  ]diplopia  [  ]altered mental status   [  ]fevers  [ ]chills [ ]nausea  [ ]vomitting  [  ]dysphagia    [ ]abdominal pain  [ ]melena  [ ]BRBPR    [  ]epistaxis  [  ]rash    [X ]lower extremity edema        [ X] All others negative	  [ ] Unable to obtain    PHYSICAL EXAM:  T(C): 36.8 (10-08-17 @ 13:45), Max: 37.1 (10-07-17 @ 21:48)  HR: 82 (10-08-17 @ 13:45) (66 - 82)  BP: 132/76 (10-08-17 @ 13:45) (132/76 - 166/88)  RR: 18 (10-08-17 @ 13:45) (18 - 20)  SpO2: 95% (10-08-17 @ 13:45) (93% - 97%)  Wt(kg): --  I&O's Summary    07 Oct 2017 07:01  -  08 Oct 2017 07:00  --------------------------------------------------------  IN: 0 mL / OUT: 4500 mL / NET: -4500 mL    08 Oct 2017 07:01  -  08 Oct 2017 13:50  --------------------------------------------------------  IN: 0 mL / OUT: 1150 mL / NET: -1150 mL          HEENT:   Normal oral mucosa, PERRL, EOMI	  Lymphatic: No obvious lymphadenopathy , no edema  Cardiovascular: Normal S1 S2, No JVD,  1/6 JOSE D murmur , Peripheral pulses palpable 2+ bilaterally  Respiratory: Lungs clear to auscultation, normal effort 	  Gastrointestinal:  Soft, Non-tender, + BS	  Skin: No rashes, No cyanosis, warm to touch  Musculoskeletal: Normal range of motion, normal strength  Psychiatry:  Appropriate Mood & affect     TELEMETRY: Sinus 	    ECG:  	Afib LBBB 79 BPM  RADIOLOGY:     CXR:  Mild CHF with b/l Pleural effusions      LABS:	 	    CARDIAC MARKERS:  CARDIAC MARKERS ( 07 Oct 2017 16:25 )  x     / <0.01 ng/mL / x     / x     / 4.2 ng/mL                              14.5   6.46  )-----------( 214      ( 08 Oct 2017 09:28 )             40.0     Hb Trend: 14.5<--, 14.6<--    10-08    140  |  100  |  17  ----------------------------<  116<H>  3.3<L>   |  27  |  0.97    Ca    9.4      08 Oct 2017 09:19    TPro  7.5  /  Alb  4.0  /  TBili  0.3  /  DBili  x   /  AST  28  /  ALT  27  /  AlkPhos  49  10-07    Creatinine Trend: 0.97<--, 1.03<--    Coags:      proBNP: Serum Pro-Brain Natriuretic Peptide: 2452 pg/mL (10-07 @ 16:25)      ASSESSMENT/PLAN: 	82y Male CHF, HTN, HLD, LBBB, old MI, CAD, Moderate LV dysfx, reportedly cathed 5 years ago in [place] and medical managment was suggested who now is sent in by his PMD for worsening shortness of breath new afib.    - echo  - Cont therapeutic lovenox.  - F/u EP eval Dr. fisher  - Cont IV lasix to keep net negative    I once again thank you for allowing me to participate in the care of your patient.  If you have any questions or concerns please do not hesitate to contact me.    Jose Manuel Hyman MD, [facility]  [facility], [facility]  [address] [place] [address]  PHONE:  [phone]  BEEPER : [phone]

## 2017-10-08 NOTE — CONSULT NOTE ADULT - PROBLEM SELECTOR RECOMMENDATION 9
The SOB seems to be related to Acute CHF exacerbation: He has had increasing pedal edema with bilateral effusion on chest radiograph with no clinical features of pneumonia: He feels better with IV lasix and his old echo was with poor ejection fraction. Clinical probability for vte is low: marleny hinds on therapeutic Lovenox anyway: Would check his dopplers too: He feels better: he has asthma too, but he has not been wheezing: His SOB is explained more  by CHF rather then Asthma

## 2017-10-09 LAB
ANION GAP SERPL CALC-SCNC: 15 MMOL/L — SIGNIFICANT CHANGE UP (ref 5–17)
BUN SERPL-MCNC: 18 MG/DL — SIGNIFICANT CHANGE UP (ref 7–23)
CALCIUM SERPL-MCNC: 9.5 MG/DL — SIGNIFICANT CHANGE UP (ref 8.4–10.5)
CHLORIDE SERPL-SCNC: 99 MMOL/L — SIGNIFICANT CHANGE UP (ref 96–108)
CO2 SERPL-SCNC: 25 MMOL/L — SIGNIFICANT CHANGE UP (ref 22–31)
CREAT SERPL-MCNC: 1.05 MG/DL — SIGNIFICANT CHANGE UP (ref 0.5–1.3)
GLUCOSE SERPL-MCNC: 122 MG/DL — HIGH (ref 70–99)
POTASSIUM SERPL-MCNC: 3.6 MMOL/L — SIGNIFICANT CHANGE UP (ref 3.5–5.3)
POTASSIUM SERPL-SCNC: 3.6 MMOL/L — SIGNIFICANT CHANGE UP (ref 3.5–5.3)
SODIUM SERPL-SCNC: 139 MMOL/L — SIGNIFICANT CHANGE UP (ref 135–145)

## 2017-10-09 PROCEDURE — 93306 TTE W/DOPPLER COMPLETE: CPT | Mod: 26

## 2017-10-09 PROCEDURE — 71250 CT THORAX DX C-: CPT | Mod: 26

## 2017-10-09 RX ADMIN — BUDESONIDE AND FORMOTEROL FUMARATE DIHYDRATE 2 PUFF(S): 160; 4.5 AEROSOL RESPIRATORY (INHALATION) at 21:18

## 2017-10-09 RX ADMIN — Medication 3 MILLILITER(S): at 19:31

## 2017-10-09 RX ADMIN — MONTELUKAST 10 MILLIGRAM(S): 4 TABLET, CHEWABLE ORAL at 11:04

## 2017-10-09 RX ADMIN — LISINOPRIL 10 MILLIGRAM(S): 2.5 TABLET ORAL at 05:28

## 2017-10-09 RX ADMIN — Medication 60 MILLIGRAM(S): at 05:27

## 2017-10-09 RX ADMIN — ENOXAPARIN SODIUM 60 MILLIGRAM(S): 100 INJECTION SUBCUTANEOUS at 21:18

## 2017-10-09 RX ADMIN — Medication 81 MILLIGRAM(S): at 11:04

## 2017-10-09 RX ADMIN — AMLODIPINE BESYLATE 10 MILLIGRAM(S): 2.5 TABLET ORAL at 05:28

## 2017-10-09 RX ADMIN — Medication 100 MILLIGRAM(S): at 05:28

## 2017-10-09 RX ADMIN — ENOXAPARIN SODIUM 60 MILLIGRAM(S): 100 INJECTION SUBCUTANEOUS at 11:04

## 2017-10-09 RX ADMIN — Medication 3 MILLILITER(S): at 05:27

## 2017-10-09 RX ADMIN — Medication 50 MILLIGRAM(S): at 17:14

## 2017-10-09 RX ADMIN — BUDESONIDE AND FORMOTEROL FUMARATE DIHYDRATE 2 PUFF(S): 160; 4.5 AEROSOL RESPIRATORY (INHALATION) at 09:00

## 2017-10-09 RX ADMIN — Medication 50 MILLIGRAM(S): at 05:27

## 2017-10-09 NOTE — PROGRESS NOTE ADULT - SUBJECTIVE AND OBJECTIVE BOX
INTERVAL HPI/OVERNIGHT EVENTS: Feel better.   Vital Signs Last 24 Hrs  T(C): 36.6 (09 Oct 2017 12:32), Max: 36.6 (08 Oct 2017 21:29)  T(F): 97.9 (09 Oct 2017 12:32), Max: 97.9 (09 Oct 2017 04:53)  HR: 74 (09 Oct 2017 12:32) (71 - 74)  BP: 133/74 (09 Oct 2017 12:32) (133/74 - 159/89)  BP(mean): --  RR: 18 (09 Oct 2017 12:32) (18 - 18)  SpO2: 93% (09 Oct 2017 12:32) (93% - 97%)  I&O's Summary    08 Oct 2017 07:01  -  09 Oct 2017 07:00  --------------------------------------------------------  IN: 1030 mL / OUT: 1850 mL / NET: -820 mL    09 Oct 2017 07:01  -  09 Oct 2017 17:00  --------------------------------------------------------  IN: 680 mL / OUT: 450 mL / NET: 230 mL      MEDICATIONS  (STANDING):  amLODIPine   Tablet 10 milliGRAM(s) Oral daily  aspirin enteric coated 81 milliGRAM(s) Oral daily  buDESOnide 160 MICROgram(s)/formoterol 4.5 MICROgram(s) Inhaler 2 Puff(s) Inhalation two times a day  docusate sodium 100 milliGRAM(s) Oral three times a day  enoxaparin Injectable 60 milliGRAM(s) SubCutaneous two times a day  furosemide   Injectable 60 milliGRAM(s) IV Push daily  influenza   Vaccine 0.5 milliLiter(s) IntraMuscular once  lisinopril 10 milliGRAM(s) Oral daily  metoprolol 50 milliGRAM(s) Oral two times a day  montelukast 10 milliGRAM(s) Oral daily  senna 2 Tablet(s) Oral at bedtime    MEDICATIONS  (PRN):  ALBUTerol/ipratropium for Nebulization 3 milliLiter(s) Nebulizer every 6 hours PRN Shortness of Breath and/or Wheezing    LABS:                        14.5   6.46  )-----------( 214      ( 08 Oct 2017 09:28 )             40.0     10-09    139  |  99  |  18  ----------------------------<  122<H>  3.6   |  25  |  1.05    Ca    9.5      09 Oct 2017 07:23          CAPILLARY BLOOD GLUCOSE            Consultant(s) Notes Reviewed:  [x ] YES  [ ] NO    PHYSICAL EXAM:  GENERAL: NAD, well-groomed, well-developed, not in any distress ,  HEAD:  Atraumatic, Normocephalic  EYES: EOMI, PERRLA, conjunctiva and sclera clear  ENMT: No tonsillar erythema, exudates, or enlargement; Moist mucous membranes, Good dentition, No lesions  NECK: Supple, No JVD, Normal thyroid  NERVOUS SYSTEM:  Alert & Oriented X3, No focal deficit   CHEST/LUNG: Good air entry bilateral with basal rales, rhonchi,   HEART: Regular rate and rhythm; No murmurs, rubs, or gallops  ABDOMEN: Soft, Nontender, Nondistended; Bowel sounds present  EXTREMITIES:  2+ Peripheral Pulses, No clubbing, cyanosis, or edema  SKIN: No rashes or lesions    Care Discussed with Consultants/Other Providers [ x] YES  [ ] NO

## 2017-10-09 NOTE — CONSULT NOTE ADULT - SUBJECTIVE AND OBJECTIVE BOX
EP ATTENDING      HISTORY OF PRESENT ILLNESS: He is a pleasant 81 y/o male with persistent AF since at least 2016. He is managed with metoprolol. He denies palpitations nor syncope, and is now admitted with dyspena likely secondary to worsening heart failure. Last LVEF around 40%.     PAST MEDICAL & SURGICAL HISTORY:  persistent AF  Hyperlipidemia  Hypertension  Left bundle branch block  Acid reflux  H/O hernia repair    MEDICATIONS  (STANDING):  amLODIPine   Tablet 10 milliGRAM(s) Oral daily  aspirin enteric coated 81 milliGRAM(s) Oral daily  buDESOnide 160 MICROgram(s)/formoterol 4.5 MICROgram(s) Inhaler 2 Puff(s) Inhalation two times a day  docusate sodium 100 milliGRAM(s) Oral three times a day  enoxaparin Injectable 60 milliGRAM(s) SubCutaneous two times a day  furosemide   Injectable 60 milliGRAM(s) IV Push daily  influenza   Vaccine 0.5 milliLiter(s) IntraMuscular once  lisinopril 10 milliGRAM(s) Oral daily  metoprolol 50 milliGRAM(s) Oral two times a day  montelukast 10 milliGRAM(s) Oral daily  senna 2 Tablet(s) Oral at bedtime    No Known Allergies    FAMILY HISTORY:  Family history of acute myocardial infarction (Sibling)  Non-contributary for premature coronary disease or sudden cardiac death    SOCIAL HISTORY:    [ x] Non-smoker  [ ] Smoker  [ ] Alcohol      REVIEW OF SYSTEMS:  [ ]chest pain  [ x ]shortness of breath  [  ]palpitations  [  ]syncope  [ ]near syncope [ ]upper extremity weakness   [ ] lower extremity weakness  [  ]diplopia  [  ]altered mental status   [  ]fevers  [ ]chills [ ]nausea  [ ]vomitting  [  ]dysphagia    [ ]abdominal pain  [ ]melena  [ ]BRBPR    [  ]epistaxis  [  ]rash    [ ]lower extremity edema        [x ] All others negative	  [ ] Unable to obtain    PHYSICAL EXAM:  T(C): 36.6 (10-09-17 @ 12:32), Max: 36.6 (10-08-17 @ 21:29)  HR: 74 (10-09-17 @ 12:32) (71 - 74)  BP: 133/74 (10-09-17 @ 12:32) (133/74 - 159/89)  RR: 18 (10-09-17 @ 12:32) (18 - 18)  SpO2: 93% (10-09-17 @ 12:32) (93% - 97%)  Wt(kg): --    no JVD  IRR, no murmurs  CTAB  soft nt/nd  no c/c/e    TELEMETRY: 	  persistent AF  ECG:  	AF, LBBB    Echo: pending  NST: pending  Cath: n/a  	  	  LABS:	 	                          14.5   6.46  )-----------( 214      ( 08 Oct 2017 09:28 )             40.0     10-09    139  |  99  |  18  ----------------------------<  122<H>  3.6   |  25  |  1.05    Ca    9.5      09 Oct 2017 07:23    TPro  7.5  /  Alb  4.0  /  TBili  0.3  /  DBili  x   /  AST  28  /  ALT  27  /  AlkPhos  49  10-07    proBNP:   Lipid Profile:   HgA1c:   TSH:     ASSESSMENT/PLAN: He is a pleasant 81 y/o male with persistent AF since at least 2016. He is managed with metoprolol. He denies palpitations nor syncope, and is now admitted with dyspena likely secondary to worsening heart failure. Last LVEF around 40%.     -continue metoprolol  -continue lovenox for now, recommend lifelong A/C given elevated chads-vasc score  -final EP reccs pending echo   -medical therapy for decompensated CHF as per cardiology      Tl Posey M.D., [facility]  [phone]

## 2017-10-09 NOTE — PROGRESS NOTE ADULT - SUBJECTIVE AND OBJECTIVE BOX
Patient is a 82y old  Male who presents with a chief complaint of sob (07 Oct 2017 19:25)      pt has been doing ok  some SOB and some cough    Any change in ROS:     MEDICATIONS  (STANDING):  amLODIPine   Tablet 10 milliGRAM(s) Oral daily  aspirin enteric coated 81 milliGRAM(s) Oral daily  buDESOnide 160 MICROgram(s)/formoterol 4.5 MICROgram(s) Inhaler 2 Puff(s) Inhalation two times a day  docusate sodium 100 milliGRAM(s) Oral three times a day  enoxaparin Injectable 60 milliGRAM(s) SubCutaneous two times a day  furosemide   Injectable 60 milliGRAM(s) IV Push daily  influenza   Vaccine 0.5 milliLiter(s) IntraMuscular once  lisinopril 10 milliGRAM(s) Oral daily  metoprolol 50 milliGRAM(s) Oral two times a day  montelukast 10 milliGRAM(s) Oral daily  senna 2 Tablet(s) Oral at bedtime    MEDICATIONS  (PRN):  ALBUTerol/ipratropium for Nebulization 3 milliLiter(s) Nebulizer every 6 hours PRN Shortness of Breath and/or Wheezing    Vital Signs Last 24 Hrs  T(C): 36.6 (09 Oct 2017 04:53), Max: 36.8 (08 Oct 2017 13:45)  T(F): 97.9 (09 Oct 2017 04:53), Max: 98.2 (08 Oct 2017 13:45)  HR: 73 (09 Oct 2017 04:53) (71 - 82)  BP: 159/89 (09 Oct 2017 04:53) (132/76 - 159/89)  BP(mean): --  RR: 18 (09 Oct 2017 04:53) (18 - 18)  SpO2: 96% (09 Oct 2017 04:53) (95% - 97%)    I&O's Summary    08 Oct 2017 07:01  -  09 Oct 2017 07:00  --------------------------------------------------------  IN: 1030 mL / OUT: 1850 mL / NET: -820 mL          Physical Exam:   GENERAL: NAD, well-groomed, well-developed  HEENT: TONY/   Atraumatic, Normocephalic  ENMT: No tonsillar erythema, exudates, or enlargement; Moist mucous membranes, Good dentition, No lesions  NECK: Supple, No JVD, Normal thyroid  CHEST/LUNG: crackles left base+  CVS: Regular rate and rhythm; No murmurs, rubs, or gallops  GI: : Soft, Nontender, Nondistended; Bowel sounds present  NERVOUS SYSTEM:  Alert & Oriented X3  EXTREMITIES:  2+ Peripheral Pulses, No clubbing, cyanosis, or edema  LYMPH: No lymphadenopathy noted  SKIN: No rashes or lesions  ENDOCRINOLOGY: No Thyromegaly  PSYCH: Appropriate    Labs:  30  CARDIAC MARKERS ( 07 Oct 2017 16:25 )  x     / <0.01 ng/mL / x     / x     / 4.2 ng/mL                            14.5   6.46  )-----------( 214      ( 08 Oct 2017 09:28 )             40.0                         14.6   8.5   )-----------( 229      ( 07 Oct 2017 16:25 )             41.1     10-09    139  |  99  |  18  ----------------------------<  122<H>  3.6   |  25  |  1.05  10-08    140  |  100  |  17  ----------------------------<  116<H>  3.3<L>   |  27  |  0.97  10-07    137  |  98  |  22  ----------------------------<  148<H>  4.1   |  28  |  1.03    Ca    9.5      09 Oct 2017 07:23  Ca    9.4      08 Oct 2017 09:19  Ca    9.4      07 Oct 2017 16:25    TPro  7.5  /  Alb  4.0  /  TBili  0.3  /  DBili  x   /  AST  28  /  ALT  27  /  AlkPhos  49  10-07    CAPILLARY BLOOD GLUCOSE          LIVER FUNCTIONS - ( 07 Oct 2017 16:25 )  Alb: 4.0 g/dL / Pro: 7.5 g/dL / ALK PHOS: 49 U/L / ALT: 27 U/L RC / AST: 28 U/L / GGT: x           PT/INR - ( 07 Oct 2017 16:25 )   PT: 12.9 sec;   INR: 1.19 ratio         PTT - ( 07 Oct 2017 16:25 )  PTT:28.6 sec    Serum Pro-Brain Natriuretic Peptide: 2452 pg/mL (10-07 @ 16:25)  Cultures:           < from: Xray Chest 1 View AP- PORTABLE-Urgent (10.07.17 @ 16:52) >    EXAM: Frontal view of the chest with comparison made to chest radiograph   on 8/25/2016    FINDINGS:   Bibasilar opacities. Hilar vasculature is indistinct. No pneumothorax.  The heart size is slightly enlarged.  No acute bony pathology.    IMPRESSION:   Mild pulmonary edema with small bilateral pleural effusions.                ALLEN TONEY M.D., RADIOLOGY RESIDENT  This document has been electronically signed.  AGUSTIN RODAS M.D., ATTENDING RADIOLOGIST  This document has been electronically signed. Oct  8 2017  8:48AM        < end of copied text >                    Studies  Chest X-RAY  CT SCAN Chest   Venous Dopplers: LE:   CT Abdomen  Others

## 2017-10-09 NOTE — PROGRESS NOTE ADULT - SUBJECTIVE AND OBJECTIVE BOX
Subjective: No chest pain or sob   	  ALBUTerol/ipratropium for Nebulization 3 milliLiter(s) Nebulizer every 6 hours PRN  amLODIPine   Tablet 10 milliGRAM(s) Oral daily  aspirin enteric coated 81 milliGRAM(s) Oral daily  buDESOnide 160 MICROgram(s)/formoterol 4.5 MICROgram(s) Inhaler 2 Puff(s) Inhalation two times a day  docusate sodium 100 milliGRAM(s) Oral three times a day  enoxaparin Injectable 60 milliGRAM(s) SubCutaneous two times a day  furosemide   Injectable 60 milliGRAM(s) IV Push daily  influenza   Vaccine 0.5 milliLiter(s) IntraMuscular once  lisinopril 10 milliGRAM(s) Oral daily  metoprolol 50 milliGRAM(s) Oral two times a day  montelukast 10 milliGRAM(s) Oral daily  senna 2 Tablet(s) Oral at bedtime                            14.5   6.46  )-----------( 214      ( 08 Oct 2017 09:28 )             40.0       10-09    139  |  99  |  18  ----------------------------<  122<H>  3.6   |  25  |  1.05    Ca    9.5      09 Oct 2017 07:23    TPro  7.5  /  Alb  4.0  /  TBili  0.3  /  DBili  x   /  AST  28  /  ALT  27  /  AlkPhos  49  10-07      CARDIAC MARKERS ( 07 Oct 2017 16:25 )  x     / <0.01 ng/mL / x     / x     / 4.2 ng/mL        T(C): 36.6 (10-09-17 @ 04:53), Max: 36.8 (10-08-17 @ 13:45)  HR: 73 (10-09-17 @ 04:53) (71 - 82)  BP: 159/89 (10-09-17 @ 04:53) (132/76 - 159/89)  RR: 18 (10-09-17 @ 04:53) (18 - 18)  SpO2: 96% (10-09-17 @ 04:53) (95% - 97%)  Wt(kg): --    I&O's Summary    08 Oct 2017 07:01  -  09 Oct 2017 07:00  --------------------------------------------------------  IN: 1030 mL / OUT: 1850 mL / NET: -820 mL      Heart: normal S1, S2, IRR, no m/r/g  Lungs: cta b/l  Abd: soft nT, nD  Ext: no edema    TELEMETRY: AF 50-100s	    ECG:  	  RADIOLOGY:   DIAGNOSTIC TESTING:  [ ] Echocardiogram:  [ ]  Catheterization:  [ ] Stress Test:    OTHER: 	      ASSESSMENT/PLAN: 82y Male CHF, HTN, HLD, LBBB, old MI, CAD, Moderate LV dysfx, reportedly cathed 5 years ago in [place] and medical managment was suggested who now is sent in by his PMD for worsening shortness of breath new afib.    -Continue with ac with lovenox  -Check TTE  -Monitor I/O on IV lasix  -f/u eps  -further workup pending above    Gabbi Hernández MD  [facility]  [address] [place] [address], [address]  [place] [address]  office: [phone]  pager: [phone]

## 2017-10-10 ENCOUNTER — TRANSCRIPTION ENCOUNTER (OUTPATIENT)
Age: 82
End: 2017-10-10

## 2017-10-10 LAB
ANION GAP SERPL CALC-SCNC: 13 MMOL/L — SIGNIFICANT CHANGE UP (ref 5–17)
ANION GAP SERPL CALC-SCNC: 16 MMOL/L — SIGNIFICANT CHANGE UP (ref 5–17)
BUN SERPL-MCNC: 23 MG/DL — SIGNIFICANT CHANGE UP (ref 7–23)
BUN SERPL-MCNC: 28 MG/DL — HIGH (ref 7–23)
CALCIUM SERPL-MCNC: 9.2 MG/DL — SIGNIFICANT CHANGE UP (ref 8.4–10.5)
CALCIUM SERPL-MCNC: 9.5 MG/DL — SIGNIFICANT CHANGE UP (ref 8.4–10.5)
CHLORIDE SERPL-SCNC: 100 MMOL/L — SIGNIFICANT CHANGE UP (ref 96–108)
CHLORIDE SERPL-SCNC: 96 MMOL/L — SIGNIFICANT CHANGE UP (ref 96–108)
CK MB BLD-MCNC: 6.6 % — HIGH (ref 0–3.5)
CK MB CFR SERPL CALC: 2.3 NG/ML — SIGNIFICANT CHANGE UP (ref 0–6.7)
CK SERPL-CCNC: 35 U/L — SIGNIFICANT CHANGE UP (ref 30–200)
CO2 SERPL-SCNC: 24 MMOL/L — SIGNIFICANT CHANGE UP (ref 22–31)
CO2 SERPL-SCNC: 26 MMOL/L — SIGNIFICANT CHANGE UP (ref 22–31)
CREAT SERPL-MCNC: 1.1 MG/DL — SIGNIFICANT CHANGE UP (ref 0.5–1.3)
CREAT SERPL-MCNC: 1.29 MG/DL — SIGNIFICANT CHANGE UP (ref 0.5–1.3)
GLUCOSE SERPL-MCNC: 118 MG/DL — HIGH (ref 70–99)
GLUCOSE SERPL-MCNC: 141 MG/DL — HIGH (ref 70–99)
NT-PROBNP SERPL-SCNC: 1567 PG/ML — HIGH (ref 0–300)
POTASSIUM SERPL-MCNC: 3.7 MMOL/L — SIGNIFICANT CHANGE UP (ref 3.5–5.3)
POTASSIUM SERPL-MCNC: 4.1 MMOL/L — SIGNIFICANT CHANGE UP (ref 3.5–5.3)
POTASSIUM SERPL-SCNC: 3.7 MMOL/L — SIGNIFICANT CHANGE UP (ref 3.5–5.3)
POTASSIUM SERPL-SCNC: 4.1 MMOL/L — SIGNIFICANT CHANGE UP (ref 3.5–5.3)
SODIUM SERPL-SCNC: 136 MMOL/L — SIGNIFICANT CHANGE UP (ref 135–145)
SODIUM SERPL-SCNC: 139 MMOL/L — SIGNIFICANT CHANGE UP (ref 135–145)
TROPONIN T SERPL-MCNC: <0.01 NG/ML — SIGNIFICANT CHANGE UP (ref 0–0.06)

## 2017-10-10 PROCEDURE — 93970 EXTREMITY STUDY: CPT | Mod: 26

## 2017-10-10 RX ORDER — FONDAPARINUX SODIUM 2.5 MG/.5ML
1 INJECTION, SOLUTION SUBCUTANEOUS
Qty: 42 | Refills: 0 | OUTPATIENT
Start: 2017-10-10 | End: 2017-10-31

## 2017-10-10 RX ADMIN — Medication 100 MILLIGRAM(S): at 17:27

## 2017-10-10 RX ADMIN — BUDESONIDE AND FORMOTEROL FUMARATE DIHYDRATE 2 PUFF(S): 160; 4.5 AEROSOL RESPIRATORY (INHALATION) at 21:50

## 2017-10-10 RX ADMIN — SENNA PLUS 2 TABLET(S): 8.6 TABLET ORAL at 21:50

## 2017-10-10 RX ADMIN — BUDESONIDE AND FORMOTEROL FUMARATE DIHYDRATE 2 PUFF(S): 160; 4.5 AEROSOL RESPIRATORY (INHALATION) at 09:18

## 2017-10-10 RX ADMIN — ENOXAPARIN SODIUM 60 MILLIGRAM(S): 100 INJECTION SUBCUTANEOUS at 09:28

## 2017-10-10 RX ADMIN — Medication 3 MILLILITER(S): at 21:50

## 2017-10-10 RX ADMIN — Medication 3 MILLILITER(S): at 09:18

## 2017-10-10 RX ADMIN — Medication 50 MILLIGRAM(S): at 05:45

## 2017-10-10 RX ADMIN — Medication 100 MILLIGRAM(S): at 05:45

## 2017-10-10 RX ADMIN — Medication 100 MILLIGRAM(S): at 21:50

## 2017-10-10 RX ADMIN — Medication 60 MILLIGRAM(S): at 05:45

## 2017-10-10 RX ADMIN — AMLODIPINE BESYLATE 10 MILLIGRAM(S): 2.5 TABLET ORAL at 05:46

## 2017-10-10 RX ADMIN — Medication 50 MILLIGRAM(S): at 17:28

## 2017-10-10 RX ADMIN — Medication 81 MILLIGRAM(S): at 11:43

## 2017-10-10 RX ADMIN — LISINOPRIL 10 MILLIGRAM(S): 2.5 TABLET ORAL at 05:45

## 2017-10-10 RX ADMIN — ENOXAPARIN SODIUM 60 MILLIGRAM(S): 100 INJECTION SUBCUTANEOUS at 21:50

## 2017-10-10 RX ADMIN — MONTELUKAST 10 MILLIGRAM(S): 4 TABLET, CHEWABLE ORAL at 11:43

## 2017-10-10 NOTE — DISCHARGE NOTE ADULT - PLAN OF CARE
Stable Weigh yourself daily.  If you gain 3lbs in 3 days, or 5lbs in a week call your Health Care Provider.  Do not eat or drink foods containing more than 2000mg of salt (sodium) in your diet every day.  Call your Health Care Provider if you have any swelling or increased swelling in your feet, ankles, and/or stomach.  Take all of your medication as directed.  If you become dizzy call your Health Care Provider. Atrial fibrillation is the most common heart rhythm problem & has the risk of stroke & heart attack  It helps if you control your blood pressure, not drink more than 1-2 alcohol drinks per day, cut down on caffeine, getting treatment for over active thyroid gland, & getting exercise  Call your doctor if you feel your heart racing or beating unusually, chest tightness or pain, lightheaded, faint, shortness of breath especially with exercise  It is important to take your heart medication as prescribed  You may be on anticoagulation which is very important to take as directed Follow up with your medical doctor to establish long term blood pressure treatment goals. Call your Health Care provider upon arrival home to make a follow up appointment within one week.  Take all inhalers as prescribed by your Health Care Provider.  Take steroids as prescribed by your Health Care Provider.  If your cough increases infrequency and severity and/or you have shortness of breath or increased shortness of breath call your Health Care Provider.  If you develop fever, chills, night sweats, malaise, and/or change in mental status call your Health care Provider.  Nutrition is very important.  Eat small frequent meals.  Increase your activity as tolerated.  Do not stay in bed all day

## 2017-10-10 NOTE — PROGRESS NOTE ADULT - SUBJECTIVE AND OBJECTIVE BOX
Patient is a 82y old  Male who presents with a chief complaint of sob (07 Oct 2017 19:25)    I am doing ok  my breathing is ok too    Any change in ROS:     MEDICATIONS  (STANDING):  amLODIPine   Tablet 10 milliGRAM(s) Oral daily  aspirin enteric coated 81 milliGRAM(s) Oral daily  buDESOnide 160 MICROgram(s)/formoterol 4.5 MICROgram(s) Inhaler 2 Puff(s) Inhalation two times a day  docusate sodium 100 milliGRAM(s) Oral three times a day  enoxaparin Injectable 60 milliGRAM(s) SubCutaneous two times a day  furosemide   Injectable 60 milliGRAM(s) IV Push daily  influenza   Vaccine 0.5 milliLiter(s) IntraMuscular once  lisinopril 10 milliGRAM(s) Oral daily  metoprolol 50 milliGRAM(s) Oral two times a day  montelukast 10 milliGRAM(s) Oral daily  senna 2 Tablet(s) Oral at bedtime    MEDICATIONS  (PRN):  ALBUTerol/ipratropium for Nebulization 3 milliLiter(s) Nebulizer every 6 hours PRN Shortness of Breath and/or Wheezing    Vital Signs Last 24 Hrs  T(C): 36.8 (10 Oct 2017 04:21), Max: 36.8 (10 Oct 2017 04:21)  T(F): 98.2 (10 Oct 2017 04:21), Max: 98.2 (10 Oct 2017 04:21)  HR: 76 (10 Oct 2017 04:21) (74 - 85)  BP: 143/72 (10 Oct 2017 04:21) (133/74 - 143/72)  BP(mean): --  RR: 18 (10 Oct 2017 04:21) (18 - 18)  SpO2: 94% (10 Oct 2017 04:21) (93% - 95%)    I&O's Summary    09 Oct 2017 07:01  -  10 Oct 2017 07:00  --------------------------------------------------------  IN: 1120 mL / OUT: 450 mL / NET: 670 mL          Physical Exam:   GENERAL: NAD, well-groomed, well-developed  HEENT: TONY/   Atraumatic, Normocephalic  ENMT: No tonsillar erythema, exudates, or enlargement; Moist mucous membranes, Good dentition, No lesions  NECK: Supple, No JVD, Normal thyroid  CHEST/LUNG:no wheezing  CVS: Regular rate and rhythm; No murmurs, rubs, or gallops  GI: : Soft, Nontender, Nondistended; Bowel sounds present  NERVOUS SYSTEM:  Alert & Oriented X3  EXTREMITIES:  2+ Peripheral Pulses, No clubbing, cyanosis, or edema  LYMPH: No lymphadenopathy noted  SKIN: No rashes or lesions  ENDOCRINOLOGY: No Thyromegaly  PSYCH: Appropriate    Labs:  30                            14.5   6.46  )-----------( 214      ( 08 Oct 2017 09:28 )             40.0                         14.6   8.5   )-----------( 229      ( 07 Oct 2017 16:25 )             41.1     10-10    139  |  100  |  23  ----------------------------<  118<H>  3.7   |  26  |  1.10  10-09    139  |  99  |  18  ----------------------------<  122<H>  3.6   |  25  |  1.05  10-08    140  |  100  |  17  ----------------------------<  116<H>  3.3<L>   |  27  |  0.97  10-07    137  |  98  |  22  ----------------------------<  148<H>  4.1   |  28  |  1.03    Ca    9.5      10 Oct 2017 07:32  Ca    9.5      09 Oct 2017 07:23    TPro  7.5  /  Alb  4.0  /  TBili  0.3  /  DBili  x   /  AST  28  /  ALT  27  /  AlkPhos  49  10-07    CAPILLARY BLOOD GLUCOSE                Serum Pro-Brain Natriuretic Peptide: 2452 pg/mL (10-07 @ 16:25)  Cultures:         < from: CT Chest No Cont (10.09.17 @ 14:19) >  INTERPRETATION:  CLINICAL INFORMATION: Shortness of breath.    COMPARISON: None available.    PROCEDURE:   CT of the Chest was performed without intravenous contrast.  Sagittal and coronal reformats were performed.    FINDINGS:    CHEST:     LUNGS AND LARGE AIRWAYS: Patent central airways. Scattered bilateral   groundglass opacities likely secondary to motion artifact. Scattered   cysts in bilateral lower lobes. A4 mm calcified granuloma in the right   lower lobe.  PLEURA: Small right and trace left pleural effusions. Additional, trace   loculated pleural effusion in the left major fissure.  VESSELS: Atherosclerotic calcifications of the thoracic aorta.  HEART: Enlarged left atrium. No pericardial effusion. Coronary artery   calcifications. Aortic valve calcifications.  MEDIASTINUM AND TIFFANIE: There are scattered subcentimeter AP window,   paratracheal, and subcarinal lymph nodes. A 1.6 x 1.1 cm subcarinal lymph   node.   CHEST WALL AND LOWER NECK: Within normal limits.  VISUALIZED UPPER ABDOMEN: Scattered punctate calcifications in the spleen.  BONES: Degenerative changes of the thoracic spine.    IMPRESSION:   Small right and trace left pleural effusions.                RASHMI CORRAL M.D., RADIOLOGY RESIDENT  This document has been electronically signed.  DEMETRIO CHEN M.D., ATTENDING RADIOLOGIST  This document has been electronically signed. Oct  9 2017  3:45PM    < end of copied text >            < from: Transthoracic Echocardiogram (10.09.17 @ 19:38) >  ventricular internal dimensions andwall thicknesses.  Right Heart: Normal right atrium. Normal right ventricular  size and function. Normal tricuspid valve. Normal pulmonic  valve.  Pericardium/Pleura: Normal pericardium with no pericardial  effusion.  Hemodynamic: Estimated right atrial pressure is 8 mm Hg.  Estimated right ventricular systolic pressure equals 31 mm  Hg, assuming right atrial pressure equals 8 mm Hg,  consistent with normal pulmonary pressures.  ------------------------------------------------------------------------  Conclusions:  1. Mildly dilated left atrium.  LA volume index = 38 cc/m2.  2. Normal left ventricular internal dimensions and wall  thicknesses.  3. Normal left ventricular systolic function. Septal motion  is consistent with LBBB.  ------------------------------------------------------------------------  Confirmed on  10/9/2017 - 16:26:08 by Kade Fernández M.D.    < end of copied text >            Studies  Chest X-RAY  CT SCAN Chest   Venous Dopplers: LE:   CT Abdomen  Others

## 2017-10-10 NOTE — DISCHARGE NOTE ADULT - CARE PLAN
Principal Discharge DX:	Acute on chronic systolic congestive heart failure  Secondary Diagnosis:	Chronic atrial fibrillation  Secondary Diagnosis:	Hypertension  Secondary Diagnosis:	Asthma Principal Discharge DX:	Acute on chronic systolic congestive heart failure  Goal:	Stable  Instructions for follow-up, activity and diet:	Weigh yourself daily.  If you gain 3lbs in 3 days, or 5lbs in a week call your Health Care Provider.  Do not eat or drink foods containing more than 2000mg of salt (sodium) in your diet every day.  Call your Health Care Provider if you have any swelling or increased swelling in your feet, ankles, and/or stomach.  Take all of your medication as directed.  If you become dizzy call your Health Care Provider.  Secondary Diagnosis:	Chronic atrial fibrillation  Instructions for follow-up, activity and diet:	Atrial fibrillation is the most common heart rhythm problem & has the risk of stroke & heart attack  It helps if you control your blood pressure, not drink more than 1-2 alcohol drinks per day, cut down on caffeine, getting treatment for over active thyroid gland, & getting exercise  Call your doctor if you feel your heart racing or beating unusually, chest tightness or pain, lightheaded, faint, shortness of breath especially with exercise  It is important to take your heart medication as prescribed  You may be on anticoagulation which is very important to take as directed  Secondary Diagnosis:	Hypertension  Instructions for follow-up, activity and diet:	Follow up with your medical doctor to establish long term blood pressure treatment goals.  Secondary Diagnosis:	Asthma  Instructions for follow-up, activity and diet:	Call your Health Care provider upon arrival home to make a follow up appointment within one week.  Take all inhalers as prescribed by your Health Care Provider.  Take steroids as prescribed by your Health Care Provider.  If your cough increases infrequency and severity and/or you have shortness of breath or increased shortness of breath call your Health Care Provider.  If you develop fever, chills, night sweats, malaise, and/or change in mental status call your Health care Provider.  Nutrition is very important.  Eat small frequent meals.  Increase your activity as tolerated.  Do not stay in bed all day

## 2017-10-10 NOTE — DISCHARGE NOTE ADULT - MEDICATION SUMMARY - MEDICATIONS TO STOP TAKING
I will STOP taking the medications listed below when I get home from the hospital:    Xarelto 15 mg oral tablet  -- 1 tab(s) by mouth 2 times a day   -- Check with your doctor before becoming pregnant.  It is very important that you take or use this exactly as directed.  Do not skip doses or discontinue unless directed by your doctor.  Obtain medical advice before taking any non-prescription drugs as some may affect the action of this medication.  Take with food.

## 2017-10-10 NOTE — DISCHARGE NOTE ADULT - CARE PROVIDER_API CALL
Martin Welch), Internal Medicine; Nephrology  [address] [place] [address]  Phone: [phone]  Fax: [phone]    Carlos Snyder), Cardiovascular Disease; Internal Medicine; Nuclear Cardiology  [address] [place] [address]  Phone: [phone]  Fax: [phone]

## 2017-10-10 NOTE — DISCHARGE NOTE ADULT - PATIENT PORTAL LINK FT
“You can access the [facility] Patient Portal, offered by [facility], by registering with the following website: [email or web address]”

## 2017-10-10 NOTE — PROGRESS NOTE ADULT - SUBJECTIVE AND OBJECTIVE BOX
Subjective: No chest pain   	  MEDICATIONS:  MEDICATIONS  (STANDING):  amLODIPine   Tablet 10 milliGRAM(s) Oral daily  aspirin enteric coated 81 milliGRAM(s) Oral daily  buDESOnide 160 MICROgram(s)/formoterol 4.5 MICROgram(s) Inhaler 2 Puff(s) Inhalation two times a day  docusate sodium 100 milliGRAM(s) Oral three times a day  enoxaparin Injectable 60 milliGRAM(s) SubCutaneous two times a day  furosemide   Injectable 60 milliGRAM(s) IV Push daily  influenza   Vaccine 0.5 milliLiter(s) IntraMuscular once  lisinopril 10 milliGRAM(s) Oral daily  metoprolol 50 milliGRAM(s) Oral two times a day  montelukast 10 milliGRAM(s) Oral daily  senna 2 Tablet(s) Oral at bedtime      LABS:	 	    CARDIAC MARKERS:  CARDIAC MARKERS ( 07 Oct 2017 16:25 )  x     / <0.01 ng/mL / x     / x     / 4.2 ng/mL            10-10    139  |  100  |  23  ----------------------------<  118<H>  3.7   |  26  |  1.10    Ca    9.5      10 Oct 2017 07:32      proBNP:   Lipid Profile:   HgA1c:   TSH:       PHYSICAL EXAM:  T(C): 36.6 (10-10-17 @ 12:12), Max: 36.8 (10-10-17 @ 04:21)  HR: 74 (10-10-17 @ 12:12) (74 - 85)  BP: 126/66 (10-10-17 @ 12:12) (126/66 - 143/72)  RR: 18 (10-10-17 @ 12:12) (18 - 18)  SpO2: 97% (10-10-17 @ 12:12) (93% - 97%)  Wt(kg): --  I&O's Summary    09 Oct 2017 07:01  -  10 Oct 2017 07:00  --------------------------------------------------------  IN: 1120 mL / OUT: 450 mL / NET: 670 mL    Heart: normal S1, S2, IRR, no m/r/g  Lungs: cta b/l   Abd: soft nT, nD  Ext: no edema      TELEMETRY: AF	    ECG:  	  RADIOLOGY:   DIAGNOSTIC TESTING:  [ ] Echocardiogram: < from: Transthoracic Echocardiogram (10.09.17 @ 19:38) >  Conclusions:  1. Mildly dilated left atrium.  LA volume index = 38 cc/m2.  2. Normal left ventricular internal dimensions and wall  thicknesses.  3. Normal left ventricular systolic function. Septal motion  is consistent with LBBB.  -----------------------------------    < end of copied text >    [ ]  Catheterization:  [ ] Stress Test:    OTHER: 	      ASSESSMENT/PLAN: 	82y Male admitted with Afib  -TTE normal LV function  -Check NST  -Pt. candidate for NOAC  -If nst normal, no further cv workup needed at this time    Gabbi Hernández MD  [facility]  [address] [place] [address], [address]  [place] [address]  office: [phone]  pager: [phone]

## 2017-10-10 NOTE — PROGRESS NOTE ADULT - SUBJECTIVE AND OBJECTIVE BOX
EPS   pt seen and examined, no complaints on exam.   no chest pain or palpitation on exam     ALBUTerol/ipratropium for Nebulization 3 milliLiter(s) Nebulizer every 6 hours PRN  amLODIPine   Tablet 10 milliGRAM(s) Oral daily  aspirin enteric coated 81 milliGRAM(s) Oral daily  buDESOnide 160 MICROgram(s)/formoterol 4.5 MICROgram(s) Inhaler 2 Puff(s) Inhalation two times a day  docusate sodium 100 milliGRAM(s) Oral three times a day  enoxaparin Injectable 60 milliGRAM(s) SubCutaneous two times a day  furosemide   Injectable 60 milliGRAM(s) IV Push daily  influenza   Vaccine 0.5 milliLiter(s) IntraMuscular once  lisinopril 10 milliGRAM(s) Oral daily  metoprolol 50 milliGRAM(s) Oral two times a day  montelukast 10 milliGRAM(s) Oral daily  senna 2 Tablet(s) Oral at bedtime                            14.5   6.46  )-----------( 214      ( 08 Oct 2017 09:28 )             40.0       Hemoglobin: 14.5 g/dL (10-08 @ 09:28)  Hemoglobin: 14.6 g/dL (10-07 @ 16:25)      10-09    139  |  99  |  18  ----------------------------<  122<H>  3.6   |  25  |  1.05    Ca    9.5      09 Oct 2017 07:23      Creatinine Trend: 1.05<--, 0.97<--, 1.03<--    COAGS:     CARDIAC MARKERS ( 07 Oct 2017 16:25 )  x     / <0.01 ng/mL / x     / x     / 4.2 ng/mL        T(C): 36.8 (10-10-17 @ 04:21), Max: 36.8 (10-10-17 @ 04:21)  HR: 76 (10-10-17 @ 04:21) (74 - 85)  BP: 143/72 (10-10-17 @ 04:21) (133/74 - 143/72)  RR: 18 (10-10-17 @ 04:21) (18 - 18)  SpO2: 94% (10-10-17 @ 04:21) (93% - 95%)  Wt(kg): --    I&O's Summary    08 Oct 2017 07:01  -  09 Oct 2017 07:00  --------------------------------------------------------  IN: 1030 mL / OUT: 1850 mL / NET: -820 mL    09 Oct 2017 07:01  -  10 Oct 2017 06:10  --------------------------------------------------------  IN: 920 mL / OUT: 450 mL / NET: 470 mL          Appearance: Normal	  HEENT:   Normal oral mucosa, PERRL, EOMI	  Lymphatic: No lymphadenopathy , ++ edema  Cardiovascular: Normal S1 S2, No JVD, No murmurs , Peripheral pulses palpable 2+ bilaterally  Respiratory: Lungs clear to auscultation, normal effort 	  Gastrointestinal:  Soft, Non-tender, + BS	      TELEMETRY: 	  NSR     DIAGNOSTIC TESTING:  [ ] Echocardiogram: < from: Transthoracic Echocardiogram (10.09.17 @ 19:38) >  Conclusions:  1. Mildly dilated left atrium.  LA volume index = 38 cc/m2.  2. Normal left ventricular internal dimensions and wall  thicknesses.  3. Normal left ventricular systolic function. Septal motion  is consistent with LBBB.  ------------------------------------------------------------------------  Confirmed on  10/9/2017 - 16:26:08 by Kade Fernández M.D.    < end of copied text >    [ ]  Catheterization:  [ ] Stress Test:    OTHER: 	        ASSESSMENT/PLAN: 	82y Male hx of LBBB, GERD, Hernia repair, HTN< chol, PAF now dyspena / chf     A/C with lovenox - recommend lifelong A/C given elevated chads-vasc score  * rate control with BB- tele stable   GI / DVT prophylaxis.   keep K>4, mag >2.0   cont Diuresis per cards  no further EPS work up needed at present   D/W Dr Posey EPS   pt seen and examined, no complaints on exam.   no chest pain or palpitation on exam     ALBUTerol/ipratropium for Nebulization 3 milliLiter(s) Nebulizer every 6 hours PRN  amLODIPine   Tablet 10 milliGRAM(s) Oral daily  aspirin enteric coated 81 milliGRAM(s) Oral daily  buDESOnide 160 MICROgram(s)/formoterol 4.5 MICROgram(s) Inhaler 2 Puff(s) Inhalation two times a day  docusate sodium 100 milliGRAM(s) Oral three times a day  enoxaparin Injectable 60 milliGRAM(s) SubCutaneous two times a day  furosemide   Injectable 60 milliGRAM(s) IV Push daily  influenza   Vaccine 0.5 milliLiter(s) IntraMuscular once  lisinopril 10 milliGRAM(s) Oral daily  metoprolol 50 milliGRAM(s) Oral two times a day  montelukast 10 milliGRAM(s) Oral daily  senna 2 Tablet(s) Oral at bedtime                            14.5   6.46  )-----------( 214      ( 08 Oct 2017 09:28 )             40.0       Hemoglobin: 14.5 g/dL (10-08 @ 09:28)  Hemoglobin: 14.6 g/dL (10-07 @ 16:25)      10-09    139  |  99  |  18  ----------------------------<  122<H>  3.6   |  25  |  1.05    Ca    9.5      09 Oct 2017 07:23      Creatinine Trend: 1.05<--, 0.97<--, 1.03<--    COAGS:     CARDIAC MARKERS ( 07 Oct 2017 16:25 )  x     / <0.01 ng/mL / x     / x     / 4.2 ng/mL        T(C): 36.8 (10-10-17 @ 04:21), Max: 36.8 (10-10-17 @ 04:21)  HR: 76 (10-10-17 @ 04:21) (74 - 85)  BP: 143/72 (10-10-17 @ 04:21) (133/74 - 143/72)  RR: 18 (10-10-17 @ 04:21) (18 - 18)  SpO2: 94% (10-10-17 @ 04:21) (93% - 95%)  Wt(kg): --    I&O's Summary    08 Oct 2017 07:01  -  09 Oct 2017 07:00  --------------------------------------------------------  IN: 1030 mL / OUT: 1850 mL / NET: -820 mL    09 Oct 2017 07:01  -  10 Oct 2017 06:10  --------------------------------------------------------  IN: 920 mL / OUT: 450 mL / NET: 470 mL          Appearance: Normal	  HEENT:   Normal oral mucosa, PERRL, EOMI	  Lymphatic: No lymphadenopathy , ++ edema  Cardiovascular: Normal S1 S2, No JVD, No murmurs , Peripheral pulses palpable 2+ bilaterally  Respiratory: Lungs clear to auscultation, normal effort 	  Gastrointestinal:  Soft, Non-tender, + BS	      TELEMETRY: 	  NSR     DIAGNOSTIC TESTING:  [ ] Echocardiogram: < from: Transthoracic Echocardiogram (10.09.17 @ 19:38) >  Conclusions:  1. Mildly dilated left atrium.  LA volume index = 38 cc/m2.  2. Normal left ventricular internal dimensions and wall  thicknesses.  3. Normal left ventricular systolic function. Septal motion  is consistent with LBBB.  ------------------------------------------------------------------------  Confirmed on  10/9/2017 - 16:26:08 by Kade Fernández M.D.    < end of copied text >    [ ]  Catheterization:  [ ] Stress Test:    OTHER: 	        ASSESSMENT/PLAN: 	82y Male hx of LBBB, GERD, Hernia repair, HTN< chol, PAF now dyspena / chf    A/C with lovenox - recommend lifelong A/C given elevated chads-vasc score  * rate control with BB- tele stable   GI / DVT prophylaxis.   keep K>4, mag >2.0   cont Diuresis per cards  no further EPS work up needed at present   D/W Dr Posey

## 2017-10-10 NOTE — DISCHARGE NOTE ADULT - MEDICATION SUMMARY - MEDICATIONS TO TAKE
I will START or STAY ON the medications listed below when I get home from the hospital:    vitamin b complex  -- 1 tab(s) by mouth once a day  -- Indication: For Supplement     calcium  -- 1 tab(s) by mouth once a day  -- Indication: For Supplement     meloxicam 15 mg oral tablet  -- 1 tab(s) by mouth once a day, As Needed  -- Indication: For Pain     aspirin 81 mg oral tablet  -- 1 tab(s) by mouth once a day  -- Indication: For Acute on chronic systolic congestive heart failure    lisinopril 10 mg oral tablet  -- 1 tab(s) by mouth once a day  -- Indication: For Hypertension    rivaroxaban 20 mg oral tablet  -- 1 tab(s) by mouth once a day   -- Check with your doctor before becoming pregnant.  It is very important that you take or use this exactly as directed.  Do not skip doses or discontinue unless directed by your doctor.  Obtain medical advice before taking any non-prescription drugs as some may affect the action of this medication.  Take with food.    -- Indication: For Chronic atrial fibrillation    Metoprolol Tartrate 50 mg oral tablet  -- 1 tab(s) by mouth 2 times a day  -- Indication: For CHF (congestive heart failure)    Ventolin HFA 90 mcg/inh inhalation aerosol  -- 2 puff(s) inhaled 2 times a day, As Needed  -- Indication: For Asthma    amLODIPine 10 mg oral tablet  -- 1 tab(s) by mouth once a day  -- Indication: For Hypertension    Lasix 40 mg oral tablet  -- 1 tab(s) by mouth once a day  -- Indication: For Diuretic     MiraLax oral powder for reconstitution  -- 17 gram(s) by mouth once a day, As Needed  -- Indication: For Stool softner     montelukast 10 mg oral tablet  -- 1 tab(s) by mouth once a day, As Needed  -- Indication: For Asthma    glucosamine  -- 1 tab(s) by mouth once a day  -- Indication: For Joint support     Visine 0.05% ophthalmic solution  -- 1 drop(s) to each affected eye 2 times a day  -- Indication: For Eye drops     One-A-Day 50+ oral tablet  -- 1 tab(s) by mouth once a day  -- Indication: For Supplement     Vitamin D3  -- 1 tab(s) by mouth once a day  -- Indication: For Supplement

## 2017-10-10 NOTE — PROGRESS NOTE ADULT - SUBJECTIVE AND OBJECTIVE BOX
INTERVAL HPI/OVERNIGHT EVENTS: Feel better and want to go home.   Vital Signs Last 24 Hrs  T(C): 36.6 (10 Oct 2017 12:12), Max: 36.8 (10 Oct 2017 04:21)  T(F): 97.9 (10 Oct 2017 12:12), Max: 98.2 (10 Oct 2017 04:21)  HR: 74 (10 Oct 2017 12:12) (74 - 85)  BP: 126/66 (10 Oct 2017 12:12) (126/66 - 143/72)  BP(mean): --  RR: 18 (10 Oct 2017 12:12) (18 - 18)  SpO2: 97% (10 Oct 2017 12:12) (94% - 97%)  I&O's Summary    09 Oct 2017 07:01  -  10 Oct 2017 07:00  --------------------------------------------------------  IN: 1120 mL / OUT: 450 mL / NET: 670 mL    10 Oct 2017 07:01  -  10 Oct 2017 18:46  --------------------------------------------------------  IN: 840 mL / OUT: 0 mL / NET: 840 mL      MEDICATIONS  (STANDING):  amLODIPine   Tablet 10 milliGRAM(s) Oral daily  aspirin enteric coated 81 milliGRAM(s) Oral daily  buDESOnide 160 MICROgram(s)/formoterol 4.5 MICROgram(s) Inhaler 2 Puff(s) Inhalation two times a day  docusate sodium 100 milliGRAM(s) Oral three times a day  enoxaparin Injectable 60 milliGRAM(s) SubCutaneous two times a day  furosemide   Injectable 60 milliGRAM(s) IV Push daily  influenza   Vaccine 0.5 milliLiter(s) IntraMuscular once  lisinopril 10 milliGRAM(s) Oral daily  metoprolol 50 milliGRAM(s) Oral two times a day  montelukast 10 milliGRAM(s) Oral daily  senna 2 Tablet(s) Oral at bedtime    MEDICATIONS  (PRN):  ALBUTerol/ipratropium for Nebulization 3 milliLiter(s) Nebulizer every 6 hours PRN Shortness of Breath and/or Wheezing    LABS:    10-10    136  |  96  |  28<H>  ----------------------------<  141<H>  4.1   |  24  |  1.29    Ca    9.2      10 Oct 2017 14:46          CAPILLARY BLOOD GLUCOSE              REVIEW OF SYSTEMS:  CONSTITUTIONAL: No fever, weight loss, or fatigue  EYES: No eye pain, visual disturbances, or discharge  ENMT:  No difficulty hearing, tinnitus, vertigo; No sinus or throat pain  NECK: No pain or stiffness  BREASTS: No pain, masses, or nipple discharge  RESPIRATORY: No cough, wheezing, chills or hemoptysis; No shortness of breath  CARDIOVASCULAR: No chest pain, palpitations, dizziness, or leg swelling  GASTROINTESTINAL: No abdominal or epigastric pain. No nausea, vomiting, or hematemesis; No diarrhea or constipation. No melena or hematochezia.  GENITOURINARY: No dysuria, frequency, hematuria, or incontinence  NEUROLOGICAL: No headaches, memory loss, loss of strength, numbness, or tremors  SKIN: No itching, burning, rashes, or lesions   LYMPH NODES: No enlarged glands  ENDOCRINE: No heat or cold intolerance; No hair loss  MUSCULOSKELETAL: No joint pain or swelling; No muscle, back, or extremity pain  PSYCHIATRIC: No depression, anxiety, mood swings, or difficulty sleeping  HEME/LYMPH: No easy bruising, or bleeding gums  ALLERY AND IMMUNOLOGIC: No hives or eczema    RADIOLOGY & ADDITIONAL TESTS:    Consultant(s) Notes Reviewed:  [x ] YES  [ ] NO    PHYSICAL EXAM:  GENERAL: NAD, well-groomed, well-developed,not in any distress ,  HEAD:  Atraumatic, Normocephalic  EYES: EOMI, PERRLA, conjunctiva and sclera clear  ENMT: No tonsillar erythema, exudates, or enlargement; Moist mucous membranes, Good dentition, No lesions  NECK: Supple, No JVD, Normal thyroid  NERVOUS SYSTEM:  Alert & Oriented X3, No focal deficit   CHEST/LUNG: Good air entry bilateral with no  rales, rhonchi, wheezing, or rubs  HEART: Regular rate and rhythm; No murmurs, rubs, or gallops  ABDOMEN: Soft, Nontender, Nondistended; Bowel sounds present  EXTREMITIES:  2+ Peripheral Pulses, No clubbing, cyanosis, or edema  SKIN: No rashes or lesions    Care Discussed with Consultants/Other Providers [ x] YES  [ ] NO

## 2017-10-11 LAB
ANION GAP SERPL CALC-SCNC: 13 MMOL/L — SIGNIFICANT CHANGE UP (ref 5–17)
BASOPHILS # BLD AUTO: 0.03 K/UL — SIGNIFICANT CHANGE UP (ref 0–0.2)
BASOPHILS NFR BLD AUTO: 0.6 % — SIGNIFICANT CHANGE UP (ref 0–2)
BUN SERPL-MCNC: 23 MG/DL — SIGNIFICANT CHANGE UP (ref 7–23)
CALCIUM SERPL-MCNC: 9.1 MG/DL — SIGNIFICANT CHANGE UP (ref 8.4–10.5)
CHLORIDE SERPL-SCNC: 99 MMOL/L — SIGNIFICANT CHANGE UP (ref 96–108)
CO2 SERPL-SCNC: 27 MMOL/L — SIGNIFICANT CHANGE UP (ref 22–31)
CREAT SERPL-MCNC: 1.06 MG/DL — SIGNIFICANT CHANGE UP (ref 0.5–1.3)
EOSINOPHIL # BLD AUTO: 0.34 K/UL — SIGNIFICANT CHANGE UP (ref 0–0.5)
EOSINOPHIL NFR BLD AUTO: 6.9 % — HIGH (ref 0–6)
GLUCOSE SERPL-MCNC: 113 MG/DL — HIGH (ref 70–99)
HCT VFR BLD CALC: 40.3 % — SIGNIFICANT CHANGE UP (ref 39–50)
HGB BLD-MCNC: 14.5 G/DL — SIGNIFICANT CHANGE UP (ref 13–17)
IMM GRANULOCYTES NFR BLD AUTO: 0.2 % — SIGNIFICANT CHANGE UP (ref 0–1.5)
LYMPHOCYTES # BLD AUTO: 1.97 K/UL — SIGNIFICANT CHANGE UP (ref 1–3.3)
LYMPHOCYTES # BLD AUTO: 39.7 % — SIGNIFICANT CHANGE UP (ref 13–44)
MCHC RBC-ENTMCNC: 30.8 PG — SIGNIFICANT CHANGE UP (ref 27–34)
MCHC RBC-ENTMCNC: 36 GM/DL — SIGNIFICANT CHANGE UP (ref 32–36)
MCV RBC AUTO: 85.6 FL — SIGNIFICANT CHANGE UP (ref 80–100)
MONOCYTES # BLD AUTO: 0.52 K/UL — SIGNIFICANT CHANGE UP (ref 0–0.9)
MONOCYTES NFR BLD AUTO: 10.5 % — SIGNIFICANT CHANGE UP (ref 2–14)
NEUTROPHILS # BLD AUTO: 2.09 K/UL — SIGNIFICANT CHANGE UP (ref 1.8–7.4)
NEUTROPHILS NFR BLD AUTO: 42.1 % — LOW (ref 43–77)
PLATELET # BLD AUTO: 206 K/UL — SIGNIFICANT CHANGE UP (ref 150–400)
POTASSIUM SERPL-MCNC: 3.8 MMOL/L — SIGNIFICANT CHANGE UP (ref 3.5–5.3)
POTASSIUM SERPL-SCNC: 3.8 MMOL/L — SIGNIFICANT CHANGE UP (ref 3.5–5.3)
RBC # BLD: 4.71 M/UL — SIGNIFICANT CHANGE UP (ref 4.2–5.8)
RBC # FLD: 12.6 % — SIGNIFICANT CHANGE UP (ref 10.3–14.5)
SODIUM SERPL-SCNC: 139 MMOL/L — SIGNIFICANT CHANGE UP (ref 135–145)
WBC # BLD: 4.96 K/UL — SIGNIFICANT CHANGE UP (ref 3.8–10.5)
WBC # FLD AUTO: 4.96 K/UL — SIGNIFICANT CHANGE UP (ref 3.8–10.5)

## 2017-10-11 RX ORDER — RIVAROXABAN 15 MG-20MG
20 KIT ORAL EVERY 24 HOURS
Qty: 0 | Refills: 0 | Status: DISCONTINUED | OUTPATIENT
Start: 2017-10-11 | End: 2017-10-12

## 2017-10-11 RX ORDER — RIVAROXABAN 15 MG-20MG
1 KIT ORAL
Qty: 30 | Refills: 0 | OUTPATIENT
Start: 2017-10-11 | End: 2017-11-10

## 2017-10-11 RX ADMIN — AMLODIPINE BESYLATE 10 MILLIGRAM(S): 2.5 TABLET ORAL at 05:27

## 2017-10-11 RX ADMIN — BUDESONIDE AND FORMOTEROL FUMARATE DIHYDRATE 2 PUFF(S): 160; 4.5 AEROSOL RESPIRATORY (INHALATION) at 09:38

## 2017-10-11 RX ADMIN — SENNA PLUS 2 TABLET(S): 8.6 TABLET ORAL at 21:25

## 2017-10-11 RX ADMIN — MONTELUKAST 10 MILLIGRAM(S): 4 TABLET, CHEWABLE ORAL at 11:41

## 2017-10-11 RX ADMIN — Medication 50 MILLIGRAM(S): at 05:27

## 2017-10-11 RX ADMIN — Medication 60 MILLIGRAM(S): at 05:26

## 2017-10-11 RX ADMIN — RIVAROXABAN 20 MILLIGRAM(S): KIT at 17:19

## 2017-10-11 RX ADMIN — Medication 100 MILLIGRAM(S): at 21:25

## 2017-10-11 RX ADMIN — Medication 3 MILLILITER(S): at 09:40

## 2017-10-11 RX ADMIN — ENOXAPARIN SODIUM 60 MILLIGRAM(S): 100 INJECTION SUBCUTANEOUS at 09:39

## 2017-10-11 RX ADMIN — BUDESONIDE AND FORMOTEROL FUMARATE DIHYDRATE 2 PUFF(S): 160; 4.5 AEROSOL RESPIRATORY (INHALATION) at 21:25

## 2017-10-11 RX ADMIN — Medication 81 MILLIGRAM(S): at 11:41

## 2017-10-11 RX ADMIN — Medication 50 MILLIGRAM(S): at 17:19

## 2017-10-11 RX ADMIN — Medication 100 MILLIGRAM(S): at 13:15

## 2017-10-11 RX ADMIN — LISINOPRIL 10 MILLIGRAM(S): 2.5 TABLET ORAL at 05:27

## 2017-10-11 RX ADMIN — Medication 100 MILLIGRAM(S): at 05:27

## 2017-10-11 NOTE — PROGRESS NOTE ADULT - SUBJECTIVE AND OBJECTIVE BOX
Patient with no anginal chest pain or shortness of breath      MEDICATIONS  (STANDING):  amLODIPine   Tablet 10 milliGRAM(s) Oral daily  aspirin enteric coated 81 milliGRAM(s) Oral daily  buDESOnide 160 MICROgram(s)/formoterol 4.5 MICROgram(s) Inhaler 2 Puff(s) Inhalation two times a day  docusate sodium 100 milliGRAM(s) Oral three times a day  enoxaparin Injectable 60 milliGRAM(s) SubCutaneous two times a day  furosemide   Injectable 60 milliGRAM(s) IV Push daily  influenza   Vaccine 0.5 milliLiter(s) IntraMuscular once  lisinopril 10 milliGRAM(s) Oral daily  metoprolol 50 milliGRAM(s) Oral two times a day  montelukast 10 milliGRAM(s) Oral daily  senna 2 Tablet(s) Oral at bedtime    MEDICATIONS  (PRN):  ALBUTerol/ipratropium for Nebulization 3 milliLiter(s) Nebulizer every 6 hours PRN Shortness of Breath and/or Wheezing      LABS:                        14.5   4.96  )-----------( 206      ( 11 Oct 2017 07:04 )             40.3     Hemoglobin: 14.5 g/dL (10-11 @ 07:04)  Hemoglobin: 14.5 g/dL (10-08 @ 09:28)  Hemoglobin: 14.6 g/dL (10-07 @ 16:25)    10-11    139  |  99  |  23  ----------------------------<  113<H>  3.8   |  27  |  1.06    Ca    9.1      11 Oct 2017 07:04      Creatinine Trend: 1.06<--, 1.29<--, 1.10<--, 1.05<--, 0.97<--, 1.03<--     CARDIAC MARKERS ( 10 Oct 2017 14:46 )  x     / <0.01 ng/mL / 35 U/L / x     / 2.3 ng/mL        PHYSICAL EXAM  Vital Signs Last 24 Hrs  T(C): 36.7 (11 Oct 2017 12:01), Max: 36.7 (10 Oct 2017 21:29)  T(F): 98.1 (11 Oct 2017 12:01), Max: 98.1 (10 Oct 2017 21:29)  HR: 81 (11 Oct 2017 12:01) (74 - 98)  BP: 124/64 (11 Oct 2017 12:01) (121/68 - 133/69)  BP(mean): --  RR: 18 (11 Oct 2017 12:01) (18 - 18)  SpO2: 95% (11 Oct 2017 12:01) (95% - 97%)    Heart: normal S1, S2, IRR, no m/r/g  Lungs: cta b/l   Abd: soft nT, nD  Ext: no edema      TELEMETRY: AF --> converted to NSR 	    ECG:  	  RADIOLOGY:   DIAGNOSTIC TESTING:  [ ] Echocardiogram: < from: Transthoracic Echocardiogram (10.09.17 @ 19:38) >  Conclusions:  1. Mildly dilated left atrium.  LA volume index = 38 cc/m2.  2. Normal left ventricular internal dimensions and wall  thicknesses.  3. Normal left ventricular systolic function. Septal motion  is consistent with LBBB.  -----------------------------------    < end of copied text >    [ ]  Catheterization:  [ ] Stress Test:    OTHER: 	      ASSESSMENT/PLAN: 	82y Male admitted with Afib who r/o ACS  -TTE normal LV function  -patient deciding whether or not he wants a  NST at this time   -Pt. candidate for NOAC given new afib and elevated chads vasc  - final recs pending above EPS   pt seen and examined, no complaints on exam.   no chest pain or palpitation on exam       MEDICATIONS  (STANDING):  amLODIPine   Tablet 10 milliGRAM(s) Oral daily  aspirin enteric coated 81 milliGRAM(s) Oral daily  buDESOnide 160 MICROgram(s)/formoterol 4.5 MICROgram(s) Inhaler 2 Puff(s) Inhalation two times a day  docusate sodium 100 milliGRAM(s) Oral three times a day  enoxaparin Injectable 60 milliGRAM(s) SubCutaneous two times a day  furosemide   Injectable 60 milliGRAM(s) IV Push daily  influenza   Vaccine 0.5 milliLiter(s) IntraMuscular once  lisinopril 10 milliGRAM(s) Oral daily  metoprolol 50 milliGRAM(s) Oral two times a day  montelukast 10 milliGRAM(s) Oral daily  senna 2 Tablet(s) Oral at bedtime    MEDICATIONS  (PRN):  ALBUTerol/ipratropium for Nebulization 3 milliLiter(s) Nebulizer every 6 hours PRN Shortness of Breath and/or Wheezing      LABS:                        14.5   4.96  )-----------( 206      ( 11 Oct 2017 07:04 )             40.3     Hemoglobin: 14.5 g/dL (10-11 @ 07:04)  Hemoglobin: 14.5 g/dL (10-08 @ 09:28)  Hemoglobin: 14.6 g/dL (10-07 @ 16:25)    10-11    139  |  99  |  23  ----------------------------<  113<H>  3.8   |  27  |  1.06    Ca    9.1      11 Oct 2017 07:04      Creatinine Trend: 1.06<--, 1.29<--, 1.10<--, 1.05<--, 0.97<--, 1.03<--     CARDIAC MARKERS ( 10 Oct 2017 14:46 )  x     / <0.01 ng/mL / 35 U/L / x     / 2.3 ng/mL        PHYSICAL EXAM  Vital Signs Last 24 Hrs  T(C): 36.7 (11 Oct 2017 12:01), Max: 36.7 (10 Oct 2017 21:29)  T(F): 98.1 (11 Oct 2017 12:01), Max: 98.1 (10 Oct 2017 21:29)  HR: 81 (11 Oct 2017 12:01) (74 - 98)  BP: 124/64 (11 Oct 2017 12:01) (121/68 - 133/69)  BP(mean): --  RR: 18 (11 Oct 2017 12:01) (18 - 18)  SpO2: 95% (11 Oct 2017 12:01) (95% - 97%)      Appearance: Normal	  HEENT:   Normal oral mucosa, PERRL, EOMI	  Lymphatic: No lymphadenopathy , ++ edema  Cardiovascular: Normal S1 S2, No JVD, No murmurs , Peripheral pulses palpable 2+ bilaterally  Respiratory: Lungs clear to auscultation, normal effort 	  Gastrointestinal:  Soft, Non-tender, + BS	      TELEMETRY: 	  NSR     DIAGNOSTIC TESTING:  [ ] Echocardiogram: < from: Transthoracic Echocardiogram (10.09.17 @ 19:38) >  Conclusions:  1. Mildly dilated left atrium.  LA volume index = 38 cc/m2.  2. Normal left ventricular internal dimensions and wall  thicknesses.  3. Normal left ventricular systolic function. Septal motion  is consistent with LBBB.  ------------------------------------------------------------------------  Confirmed on  10/9/2017 - 16:26:08 by Kade Fernández M.D.    < end of copied text >    [ ]  Catheterization:  [ ] Stress Test:    OTHER: 	        ASSESSMENT/PLAN: 	82y Male hx of LBBB, GERD, Hernia repair, HTN< chol, PAF now dyspena / chf    - A/C with lovenox - recommend lifelong A/C given elevated chads-vasc score  - rate control with BB- tele stable  -cont Diuresis per cards  no further EPS work up needed at present     Dorinda Julio [facility]  O:  [phone]  P: [phone]

## 2017-10-11 NOTE — PROGRESS NOTE ADULT - SUBJECTIVE AND OBJECTIVE BOX
Patient is a 82y old  Male who presents with a chief complaint of sob (10 Oct 2017 17:24)    doing ok    saying my breathing is better   Any change in ROS:     MEDICATIONS  (STANDING):  amLODIPine   Tablet 10 milliGRAM(s) Oral daily  aspirin enteric coated 81 milliGRAM(s) Oral daily  buDESOnide 160 MICROgram(s)/formoterol 4.5 MICROgram(s) Inhaler 2 Puff(s) Inhalation two times a day  docusate sodium 100 milliGRAM(s) Oral three times a day  enoxaparin Injectable 60 milliGRAM(s) SubCutaneous two times a day  furosemide   Injectable 60 milliGRAM(s) IV Push daily  influenza   Vaccine 0.5 milliLiter(s) IntraMuscular once  lisinopril 10 milliGRAM(s) Oral daily  metoprolol 50 milliGRAM(s) Oral two times a day  montelukast 10 milliGRAM(s) Oral daily  senna 2 Tablet(s) Oral at bedtime    MEDICATIONS  (PRN):  ALBUTerol/ipratropium for Nebulization 3 milliLiter(s) Nebulizer every 6 hours PRN Shortness of Breath and/or Wheezing    Vital Signs Last 24 Hrs  T(C): 36.6 (11 Oct 2017 03:51), Max: 36.7 (10 Oct 2017 21:29)  T(F): 97.9 (11 Oct 2017 03:51), Max: 98.1 (10 Oct 2017 21:29)  HR: 75 (11 Oct 2017 03:51) (74 - 98)  BP: 121/68 (11 Oct 2017 03:51) (121/68 - 133/69)  BP(mean): --  RR: 18 (11 Oct 2017 03:51) (18 - 18)  SpO2: 95% (11 Oct 2017 03:51) (95% - 97%)    I&O's Summary    10 Oct 2017 07:01  -  11 Oct 2017 07:00  --------------------------------------------------------  IN: 1320 mL / OUT: 200 mL / NET: 1120 mL          Physical Exam:   GENERAL: NAD, well-groomed, well-developed  HEENT: TONY/   Atraumatic, Normocephalic  ENMT: No tonsillar erythema, exudates, or enlargement; Moist mucous membranes, Good dentition, No lesions  NECK: Supple, No JVD, Normal thyroid  CHEST/LUNG: Clear to auscultaion, ; No rales, rhonchi, wheezing, or rubs  CVS: Regular rate and rhythm; No murmurs, rubs, or gallops  GI: : Soft, Nontender, Nondistended; Bowel sounds present  NERVOUS SYSTEM:  Alert & Oriented X3  EXTREMITIES:  2+ Peripheral Pulses, No clubbing, cyanosis, or edema  LYMPH: No lymphadenopathy noted  SKIN: No rashes or lesions  ENDOCRINOLOGY: No Thyromegaly  PSYCH: Appropriate    Labs:  30  CARDIAC MARKERS ( 10 Oct 2017 14:46 )  x     / <0.01 ng/mL / 35 U/L / x     / 2.3 ng/mL                            14.5   4.96  )-----------( 206      ( 11 Oct 2017 07:04 )             40.3                         14.5   6.46  )-----------( 214      ( 08 Oct 2017 09:28 )             40.0                         14.6   8.5   )-----------( 229      ( 07 Oct 2017 16:25 )             41.1     10-11    139  |  99  |  23  ----------------------------<  113<H>  3.8   |  27  |  1.06  10-10    136  |  96  |  28<H>  ----------------------------<  141<H>  4.1   |  24  |  1.29  10-10    139  |  100  |  23  ----------------------------<  118<H>  3.7   |  26  |  1.10  10-09    139  |  99  |  18  ----------------------------<  122<H>  3.6   |  25  |  1.05  10-08    140  |  100  |  17  ----------------------------<  116<H>  3.3<L>   |  27  |  0.97  10-07    137  |  98  |  22  ----------------------------<  148<H>  4.1   |  28  |  1.03    Ca    9.1      11 Oct 2017 07:04  Ca    9.2      10 Oct 2017 14:46  Ca    9.5      10 Oct 2017 07:32    TPro  7.5  /  Alb  4.0  /  TBili  0.3  /  DBili  x   /  AST  28  /  ALT  27  /  AlkPhos  49  10-07    CAPILLARY BLOOD GLUCOSE            < from: [facility] Duplex Lower Ext Vein Scan, Bilat (10.10.17 @ 15:24) >    TECHNIQUE: Duplex sonography of the BILATERAL LOWER extremities with   color and spectral Doppler, with and without compression.      FINDINGS:    There is normal compressibility of the bilateral common femoral, femoral   and popliteal veins. No calf vein thrombosis is detected.    Doppler examination shows normal spontaneous and phasic flow.    IMPRESSION:     No evidence of bilateral lower extremity deep venous thrombosis.      < from: CT Chest No Cont (10.09.17 @ 14:19) >  CHEST:     LUNGS AND LARGE AIRWAYS: Patent central airways. Scattered bilateral   groundglass opacities likely secondary to motion artifact. Scattered   cysts in bilateral lower lobes. A4 mm calcified granuloma in the right   lower lobe.  PLEURA: Small right and trace left pleural effusions. Additional, trace   loculated pleural effusion in the left major fissure.  VESSELS: Atherosclerotic calcifications of the thoracic aorta.  HEART: Enlarged left atrium. No pericardial effusion. Coronary artery   calcifications. Aortic valve calcifications.  MEDIASTINUM AND TIFFANIE: There are scattered subcentimeter AP window,   paratracheal, and subcarinal lymph nodes. A 1.6 x 1.1 cm subcarinal lymph   node.   CHEST WALL AND LOWER NECK: Within normal limits.  VISUALIZED UPPER ABDOMEN: Scattered punctate calcifications in the spleen.  BONES: Degenerative changes of the thoracic spine.    IMPRESSION:   Small right and trace left pleural effusions.                RASHMI CORRAL M.D., RADIOLOGY RESIDENT  This document has been electronically signed.  DEMETRIO CHEN M.D., ATTENDING RADIOLOGIST  This document has been electronically signed. Oct  9 2017  3:45PM        < end of copied text >                    SHIVANI BLANCO M.D., ATTENDING RADIOLOGIST  This document has been electronically signed. Oct 10 2017  4:52PM    < end of copied text >      Serum Pro-Brain Natriuretic Peptide: 1567 pg/mL (10-10 @ 14:46)  Cultures:                             Studies  Chest X-RAY  CT SCAN Chest   Venous Dopplers: LE:   CT Abdomen  Others

## 2017-10-11 NOTE — PROGRESS NOTE ADULT - SUBJECTIVE AND OBJECTIVE BOX
INTERVAL HPI/OVERNIGHT EVENTS: feeling better .   T(C): 36.7 (11 Oct 2017 12:01), Max: 36.7 (10 Oct 2017 21:29)  T(F): 98.1 (11 Oct 2017 12:01), Max: 98.1 (10 Oct 2017 21:29)  HR: 81 (11 Oct 2017 12:01) (75 - 98)  BP: 124/64 (11 Oct 2017 12:01) (121/68 - 133/69)  BP(mean): --  RR: 18 (11 Oct 2017 12:01) (18 - 18)  SpO2: 95% (11 Oct 2017 12:01) (95% - 96%)  I&O's Summary    10 Oct 2017 07:01  -  11 Oct 2017 07:00  --------------------------------------------------------  IN: 1320 mL / OUT: 200 mL / NET: 1120 mL    11 Oct 2017 07:01  -  11 Oct 2017 16:18  --------------------------------------------------------  IN: 480 mL / OUT: 0 mL / NET: 480 mL      MEDICATIONS  (STANDING):  amLODIPine   Tablet 10 milliGRAM(s) Oral daily  aspirin enteric coated 81 milliGRAM(s) Oral daily  buDESOnide 160 MICROgram(s)/formoterol 4.5 MICROgram(s) Inhaler 2 Puff(s) Inhalation two times a day  docusate sodium 100 milliGRAM(s) Oral three times a day  furosemide   Injectable 60 milliGRAM(s) IV Push daily  influenza   Vaccine 0.5 milliLiter(s) IntraMuscular once  lisinopril 10 milliGRAM(s) Oral daily  metoprolol 50 milliGRAM(s) Oral two times a day  montelukast 10 milliGRAM(s) Oral daily  rivaroxaban 20 milliGRAM(s) Oral every 24 hours  senna 2 Tablet(s) Oral at bedtime    MEDICATIONS  (PRN):  ALBUTerol/ipratropium for Nebulization 3 milliLiter(s) Nebulizer every 6 hours PRN Shortness of Breath and/or Wheezing    LABS:                        14.5   4.96  )-----------( 206      ( 11 Oct 2017 07:04 )             40.3     10-11    139  |  99  |  23  ----------------------------<  113<H>  3.8   |  27  |  1.06    Ca    9.1      11 Oct 2017 07:04          CAPILLARY BLOOD GLUCOSE              REVIEW OF SYSTEMS:  CONSTITUTIONAL: No fever, weight loss, or fatigue  EYES: No eye pain, visual disturbances, or discharge  ENMT:  No difficulty hearing, tinnitus, vertigo; No sinus or throat pain  NECK: No pain or stiffness  BREASTS: No pain, masses, or nipple discharge  RESPIRATORY: No cough, wheezing, chills or hemoptysis; No shortness of breath  CARDIOVASCULAR: No chest pain, palpitations, dizziness, or leg swelling  GASTROINTESTINAL: No abdominal or epigastric pain. No nausea, vomiting, or hematemesis; No diarrhea or constipation. No melena or hematochezia.  GENITOURINARY: No dysuria, frequency, hematuria, or incontinence  NEUROLOGICAL: No headaches, memory loss, loss of strength, numbness, or tremors  SKIN: No itching, burning, rashes, or lesions   LYMPH NODES: No enlarged glands  ENDOCRINE: No heat or cold intolerance; No hair loss  MUSCULOSKELETAL: No joint pain or swelling; No muscle, back, or extremity pain  PSYCHIATRIC: No depression, anxiety, mood swings, or difficulty sleeping  HEME/LYMPH: No easy bruising, or bleeding gums  ALLERY AND IMMUNOLOGIC: No hives or eczema    RADIOLOGY & ADDITIONAL TESTS:    Consultant(s) Notes Reviewed:  [x ] YES  [ ] NO    PHYSICAL EXAM:  GENERAL: NAD, well-groomed, well-developed,not in any distress ,  HEAD:  Atraumatic, Normocephalic  EYES: EOMI, PERRLA, conjunctiva and sclera clear  ENMT: No tonsillar erythema, exudates, or enlargement; Moist mucous membranes, Good dentition, No lesions  NECK: Supple, No JVD, Normal thyroid  NERVOUS SYSTEM:  Alert & Oriented X3, No focal deficit   CHEST/LUNG: Good air entry bilateral with basal   rales,   HEART: Regular rate and rhythm; No murmurs, rubs, or gallops  ABDOMEN: Soft, Nontender, Nondistended; Bowel sounds present  EXTREMITIES:  2+ Peripheral Pulses, No clubbing, cyanosis, or edema  SKIN: No rashes or lesions    Care Discussed with Consultants/Other Providers [ x] YES  [ ] NO

## 2017-10-11 NOTE — PROGRESS NOTE ADULT - SUBJECTIVE AND OBJECTIVE BOX
Subjective: No chest pain   	  ALBUTerol/ipratropium for Nebulization 3 milliLiter(s) Nebulizer every 6 hours PRN  amLODIPine   Tablet 10 milliGRAM(s) Oral daily  aspirin enteric coated 81 milliGRAM(s) Oral daily  buDESOnide 160 MICROgram(s)/formoterol 4.5 MICROgram(s) Inhaler 2 Puff(s) Inhalation two times a day  docusate sodium 100 milliGRAM(s) Oral three times a day  enoxaparin Injectable 60 milliGRAM(s) SubCutaneous two times a day  furosemide   Injectable 60 milliGRAM(s) IV Push daily  influenza   Vaccine 0.5 milliLiter(s) IntraMuscular once  lisinopril 10 milliGRAM(s) Oral daily  metoprolol 50 milliGRAM(s) Oral two times a day  montelukast 10 milliGRAM(s) Oral daily  senna 2 Tablet(s) Oral at bedtime                            14.5   4.96  )-----------( 206      ( 11 Oct 2017 07:04 )             40.3       10-11    139  |  99  |  23  ----------------------------<  113<H>  3.8   |  27  |  1.06    Ca    9.1      11 Oct 2017 07:04        CARDIAC MARKERS ( 10 Oct 2017 14:46 )  x     / <0.01 ng/mL / 35 U/L / x     / 2.3 ng/mL        T(C): 36.7 (10-11-17 @ 12:01), Max: 36.7 (10-10-17 @ 21:29)  HR: 81 (10-11-17 @ 12:01) (74 - 98)  BP: 124/64 (10-11-17 @ 12:01) (121/68 - 133/69)  RR: 18 (10-11-17 @ 12:01) (18 - 18)  SpO2: 95% (10-11-17 @ 12:01) (95% - 97%)  Wt(kg): --    I&O's Summary    10 Oct 2017 07:01  -  11 Oct 2017 07:00  --------------------------------------------------------  IN: 1320 mL / OUT: 200 mL / NET: 1120 mL        TELEMETRY: AF	--> SR    ECG:  	  RADIOLOGY:   DIAGNOSTIC TESTING:  [ ] Echocardiogram: < from: Transthoracic Echocardiogram (10.09.17 @ 19:38) >  Conclusions:  1. Mildly dilated left atrium.  LA volume index = 38 cc/m2.  2. Normal left ventricular internal dimensions and wall  thicknesses.  3. Normal left ventricular systolic function. Septal motion  is consistent with LBBB.  -----------------------------------    < end of copied text >    [ ]  Catheterization:  [ ] Stress Test:    OTHER: 	      ASSESSMENT/PLAN: 	82y Male admitted with Afib  -TTE normal LV function  -Pt. currently does not want NST  -If pt. refusing NST, no further cv workup needed at this time  -Pt is a candidate for NOAC, would see which NOAC is covered by insurance     Gabbi Hernández MD  [facility]  [address] [place] [address], [id number]  [place] [address]  office: [phone]  pager: [phone]

## 2017-10-11 NOTE — PHYSICAL THERAPY INITIAL EVALUATION ADULT - PERTINENT HX OF CURRENT PROBLEM, REHAB EVAL
81 y/o M pmhx CHF, HTN, HLD, LBBB, old MI, CAD, presenting sent in by his PMD for worsening shortness of breath and leg swelling.

## 2017-10-12 VITALS
OXYGEN SATURATION: 96 % | RESPIRATION RATE: 18 BRPM | HEART RATE: 75 BPM | DIASTOLIC BLOOD PRESSURE: 71 MMHG | SYSTOLIC BLOOD PRESSURE: 134 MMHG | TEMPERATURE: 99 F

## 2017-10-12 LAB
ANION GAP SERPL CALC-SCNC: 11 MMOL/L — SIGNIFICANT CHANGE UP (ref 5–17)
BUN SERPL-MCNC: 21 MG/DL — SIGNIFICANT CHANGE UP (ref 7–23)
CALCIUM SERPL-MCNC: 9 MG/DL — SIGNIFICANT CHANGE UP (ref 8.4–10.5)
CHLORIDE SERPL-SCNC: 98 MMOL/L — SIGNIFICANT CHANGE UP (ref 96–108)
CO2 SERPL-SCNC: 27 MMOL/L — SIGNIFICANT CHANGE UP (ref 22–31)
CREAT SERPL-MCNC: 1.15 MG/DL — SIGNIFICANT CHANGE UP (ref 0.5–1.3)
GLUCOSE SERPL-MCNC: 112 MG/DL — HIGH (ref 70–99)
HCT VFR BLD CALC: 39.4 % — SIGNIFICANT CHANGE UP (ref 39–50)
HGB BLD-MCNC: 13.9 G/DL — SIGNIFICANT CHANGE UP (ref 13–17)
MCHC RBC-ENTMCNC: 30.3 PG — SIGNIFICANT CHANGE UP (ref 27–34)
MCHC RBC-ENTMCNC: 35.3 GM/DL — SIGNIFICANT CHANGE UP (ref 32–36)
MCV RBC AUTO: 86 FL — SIGNIFICANT CHANGE UP (ref 80–100)
PLATELET # BLD AUTO: 213 K/UL — SIGNIFICANT CHANGE UP (ref 150–400)
POTASSIUM SERPL-MCNC: 3.9 MMOL/L — SIGNIFICANT CHANGE UP (ref 3.5–5.3)
POTASSIUM SERPL-SCNC: 3.9 MMOL/L — SIGNIFICANT CHANGE UP (ref 3.5–5.3)
RBC # BLD: 4.58 M/UL — SIGNIFICANT CHANGE UP (ref 4.2–5.8)
RBC # FLD: 12.8 % — SIGNIFICANT CHANGE UP (ref 10.3–14.5)
SODIUM SERPL-SCNC: 136 MMOL/L — SIGNIFICANT CHANGE UP (ref 135–145)
WBC # BLD: 5.08 K/UL — SIGNIFICANT CHANGE UP (ref 3.8–10.5)
WBC # FLD AUTO: 5.08 K/UL — SIGNIFICANT CHANGE UP (ref 3.8–10.5)

## 2017-10-12 RX ORDER — FUROSEMIDE 40 MG
1 TABLET ORAL
Qty: 0 | Refills: 0 | COMMUNITY

## 2017-10-12 RX ORDER — FUROSEMIDE 40 MG
1 TABLET ORAL
Qty: 30 | Refills: 0 | OUTPATIENT
Start: 2017-10-12 | End: 2017-11-11

## 2017-10-12 RX ORDER — DEXLANSOPRAZOLE 30 MG/1
1 CAPSULE, DELAYED RELEASE ORAL
Qty: 0 | Refills: 0 | COMMUNITY

## 2017-10-12 RX ADMIN — Medication 100 MILLIGRAM(S): at 13:04

## 2017-10-12 RX ADMIN — AMLODIPINE BESYLATE 10 MILLIGRAM(S): 2.5 TABLET ORAL at 06:29

## 2017-10-12 RX ADMIN — LISINOPRIL 10 MILLIGRAM(S): 2.5 TABLET ORAL at 06:29

## 2017-10-12 RX ADMIN — Medication 60 MILLIGRAM(S): at 06:29

## 2017-10-12 RX ADMIN — Medication 81 MILLIGRAM(S): at 11:11

## 2017-10-12 RX ADMIN — Medication 100 MILLIGRAM(S): at 06:29

## 2017-10-12 RX ADMIN — Medication 50 MILLIGRAM(S): at 06:29

## 2017-10-12 RX ADMIN — BUDESONIDE AND FORMOTEROL FUMARATE DIHYDRATE 2 PUFF(S): 160; 4.5 AEROSOL RESPIRATORY (INHALATION) at 08:46

## 2017-10-12 RX ADMIN — MONTELUKAST 10 MILLIGRAM(S): 4 TABLET, CHEWABLE ORAL at 11:11

## 2017-10-12 RX ADMIN — Medication 3 MILLILITER(S): at 09:47

## 2017-10-12 NOTE — PROGRESS NOTE ADULT - PROBLEM SELECTOR PLAN 2
Cont lasix: echo: seems to be pretty good
Cont lasix: awaiting echo
Cont lasix: echo: seems to be pretty good: will defer to cardiology
Cont lasix: echo: seems to be pretty good: will defer to cardiology

## 2017-10-12 NOTE — PROGRESS NOTE ADULT - SUBJECTIVE AND OBJECTIVE BOX
Patient with no anginal chest pain or shortness of breath      MEDICATIONS  (STANDING):  amLODIPine   Tablet 10 milliGRAM(s) Oral daily  aspirin enteric coated 81 milliGRAM(s) Oral daily  buDESOnide 160 MICROgram(s)/formoterol 4.5 MICROgram(s) Inhaler 2 Puff(s) Inhalation two times a day  docusate sodium 100 milliGRAM(s) Oral three times a day  furosemide   Injectable 60 milliGRAM(s) IV Push daily  influenza   Vaccine 0.5 milliLiter(s) IntraMuscular once  lisinopril 10 milliGRAM(s) Oral daily  metoprolol 50 milliGRAM(s) Oral two times a day  montelukast 10 milliGRAM(s) Oral daily  rivaroxaban 20 milliGRAM(s) Oral every 24 hours  senna 2 Tablet(s) Oral at bedtime    MEDICATIONS  (PRN):  ALBUTerol/ipratropium for Nebulization 3 milliLiter(s) Nebulizer every 6 hours PRN Shortness of Breath and/or Wheezing      LABS:                        13.9   5.08  )-----------( 213      ( 12 Oct 2017 07:18 )             39.4     Hemoglobin: 13.9 g/dL (10-12 @ 07:18)  Hemoglobin: 14.5 g/dL (10-11 @ 07:04)  Hemoglobin: 14.5 g/dL (10-08 @ 09:28)  Hemoglobin: 14.6 g/dL (10-07 @ 16:25)    10-12    136  |  98  |  21  ----------------------------<  112<H>  3.9   |  27  |  1.15    Ca    9.0      12 Oct 2017 07:17      Creatinine Trend: 1.15<--, 1.06<--, 1.29<--, 1.10<--, 1.05<--, 0.97<--     CARDIAC MARKERS ( 10 Oct 2017 14:46 )  x     / <0.01 ng/mL / 35 U/L / x     / 2.3 ng/mL        PHYSICAL EXAM  Vital Signs Last 24 Hrs  T(C): 36.6 (12 Oct 2017 04:11), Max: 36.8 (11 Oct 2017 20:40)  T(F): 97.9 (12 Oct 2017 04:11), Max: 98.3 (11 Oct 2017 20:40)  HR: 80 (12 Oct 2017 04:11) (70 - 86)  BP: 146/74 (12 Oct 2017 04:11) (124/64 - 164/82)  BP(mean): --  RR: 18 (12 Oct 2017 04:11) (18 - 18)  SpO2: 95% (12 Oct 2017 04:11) (95% - 97%)          TELEMETRY: NSR 60-90s previously PAF    ECG:  	  RADIOLOGY:   DIAGNOSTIC TESTING:  [ ] Echocardiogram: < from: Transthoracic Echocardiogram (10.09.17 @ 19:38) >  Conclusions:  1. Mildly dilated left atrium.  LA volume index = 38 cc/m2.  2. Normal left ventricular internal dimensions and wall  thicknesses.  3. Normal left ventricular systolic function. Septal motion  is consistent with LBBB.  -----------------------------------      [ ]  Catheterization: n/a  [ ] Stress Test:  n/a  	      ASSESSMENT/PLAN: 	82y Male admitted with  pmhx diastolic CHF, HTN, HLD, PAF (self dced Eliquis at home) , LBBB, old MI, CAD, presenting sent in by his PMD for worsening shortness of breath with acute CHF exacerbation who r/o ACS with NL LV function on TTE:       -Pt. refusing NST- understands risk of sudden cardiac death  -Pt to be dced on Xarelto for CVA prevention given AFIB  - c/s Norvasc/BB/PO Lasix  - patient appears euvolemic  - DC planning today per primary team    Dorinda Julio [facility]  O:  [phone]  P: [phone]

## 2017-10-12 NOTE — PROGRESS NOTE ADULT - PROBLEM SELECTOR PROBLEM 6
Coronary artery disease involving native coronary artery of native heart, angina presence unspecified

## 2017-10-12 NOTE — PROGRESS NOTE ADULT - PROBLEM SELECTOR PLAN 3
controlled: to cont current meds  10/10: no asthma exacerbation: cont symbciort:  10/11: no wheezing: cont current treatment  10/12: stable
controlled: to cont current meds
controlled: to cont current meds  10/10: no asthma exacerbation: cont symbciort:
controlled: to cont current meds  10/10: no asthma exacerbation: cont symbciort:  10/11: no whezing: cont current treatment

## 2017-10-12 NOTE — PROGRESS NOTE ADULT - SUBJECTIVE AND OBJECTIVE BOX
Patient is a 82y old  Male who presents with a chief complaint of sob (10 Oct 2017 17:24)    pt has been doing ok  no SOB  complaining of some cough with yellow color phlegm  he says in the night time when he coughs, he gets yellow phlegm   Any change in ROS:     MEDICATIONS  (STANDING):  amLODIPine   Tablet 10 milliGRAM(s) Oral daily  aspirin enteric coated 81 milliGRAM(s) Oral daily  buDESOnide 160 MICROgram(s)/formoterol 4.5 MICROgram(s) Inhaler 2 Puff(s) Inhalation two times a day  docusate sodium 100 milliGRAM(s) Oral three times a day  furosemide   Injectable 60 milliGRAM(s) IV Push daily  influenza   Vaccine 0.5 milliLiter(s) IntraMuscular once  lisinopril 10 milliGRAM(s) Oral daily  metoprolol 50 milliGRAM(s) Oral two times a day  montelukast 10 milliGRAM(s) Oral daily  rivaroxaban 20 milliGRAM(s) Oral every 24 hours  senna 2 Tablet(s) Oral at bedtime    MEDICATIONS  (PRN):  ALBUTerol/ipratropium for Nebulization 3 milliLiter(s) Nebulizer every 6 hours PRN Shortness of Breath and/or Wheezing    Vital Signs Last 24 Hrs  T(C): 36.6 (12 Oct 2017 04:11), Max: 36.8 (11 Oct 2017 20:40)  T(F): 97.9 (12 Oct 2017 04:11), Max: 98.3 (11 Oct 2017 20:40)  HR: 80 (12 Oct 2017 04:11) (70 - 86)  BP: 146/74 (12 Oct 2017 04:11) (124/64 - 164/82)  BP(mean): --  RR: 18 (12 Oct 2017 04:11) (18 - 18)  SpO2: 95% (12 Oct 2017 04:11) (95% - 97%)    I&O's Summary    11 Oct 2017 07:01  -  12 Oct 2017 07:00  --------------------------------------------------------  IN: 960 mL / OUT: 0 mL / NET: 960 mL          Physical Exam:   GENERAL: NAD, well-groomed, well-developed  HEENT: TONY/   Atraumatic, Normocephalic  ENMT: No tonsillar erythema, exudates, or enlargement; Moist mucous membranes, Good dentition, No lesions  NECK: Supple, No JVD, Normal thyroid  CHEST/LUNG: left basal coarse crackles   CVS: Regular rate and rhythm; No murmurs, rubs, or gallops  GI: : Soft, Nontender, Nondistended; Bowel sounds present  NERVOUS SYSTEM:  Alert & Oriented X3  EXTREMITIES:  2+ Peripheral Pulses, No clubbing, cyanosis, or edema  LYMPH: No lymphadenopathy noted  SKIN: No rashes or lesions  ENDOCRINOLOGY: No Thyromegaly  PSYCH: Appropriate    Labs:  30  CARDIAC MARKERS ( 10 Oct 2017 14:46 )  x     / <0.01 ng/mL / 35 U/L / x     / 2.3 ng/mL                            13.9   5.08  )-----------( 213      ( 12 Oct 2017 07:18 )             39.4                         14.5   4.96  )-----------( 206      ( 11 Oct 2017 07:04 )             40.3                         14.5   6.46  )-----------( 214      ( 08 Oct 2017 09:28 )             40.0     10-11    139  |  99  |  23  ----------------------------<  113<H>  3.8   |  27  |  1.06  10-10    136  |  96  |  28<H>  ----------------------------<  141<H>  4.1   |  24  |  1.29  10-10    139  |  100  |  23  ----------------------------<  118<H>  3.7   |  26  |  1.10  10-09    139  |  99  |  18  ----------------------------<  122<H>  3.6   |  25  |  1.05  10-08    140  |  100  |  17  ----------------------------<  116<H>  3.3<L>   |  27  |  0.97    Ca    9.1      11 Oct 2017 07:04  Ca    9.2      10 Oct 2017 14:46      CAPILLARY BLOOD GLUCOSE                Serum Pro-Brain Natriuretic Peptide: 1567 pg/mL (10-10 @ 14:46)  Cultures:           < from: [facility] Duplex Lower Ext Vein Scan, Bilat (10.10.17 @ 15:24) >    FINDINGS:    There is normal compressibility of the bilateral common femoral, femoral   and popliteal veins. No calf vein thrombosis is detected.    Doppler examination shows normal spontaneous and phasic flow.    IMPRESSION:     No evidence of bilateral lower extremity deep venous thrombosis.      < from: CT Chest No Cont (10.09.17 @ 14:19) >  ilateral lower lobes. A4 mm calcified granuloma in the right   lower lobe.  PLEURA: Small right and trace left pleural effusions. Additional, trace   loculated pleural effusion in the left major fissure.  VESSELS: Atherosclerotic calcifications of the thoracic aorta.  HEART: Enlarged left atrium. No pericardial effusion. Coronary artery   calcifications. Aortic valve calcifications.  MEDIASTINUM AND TIFFANIE: There are scattered subcentimeter AP window,   paratracheal, and subcarinal lymph nodes. A 1.6 x 1.1 cm subcarinal lymph   node.   CHEST WALL AND LOWER NECK: Within normal limits.  VISUALIZED UPPER ABDOMEN: Scattered punctate calcifications in the spleen.  BONES: Degenerative changes of the thoracic spine.    IMPRESSION:   Small right and trace left pleural effusions.                RASHMI CORRAL M.D., RADIOLOGY RESIDENT  This document has been electronically signed.  DEMETRIO CHEN M.D., ATTENDING RADIOLOGIST    < end of copied text >                    SHIVANI BLANCO M.D., ATTENDING RADIOLOGIST  This document has been electronically signed. Oct 10 2017  4:52PM    < end of copied text >                    Studies  Chest X-RAY  CT SCAN Chest   Venous Dopplers: LE:   CT Abdomen  Others

## 2017-10-12 NOTE — PROGRESS NOTE ADULT - ATTENDING COMMENTS
Agree with above assessment and plan as outlined above.    - Refused stress test    Jose Manuel Hyman MD, FACC  [facility], [facility]  [address] [place] [address]  PHONE:  [phone]  BEEPER : [phone]
EP ATTENDING    Agree with above. No further EP workup needed.
EP ATTENDING    Agree with above. Has chronic rate controlled AF. Repeat echo with normal LVEF. Recommend lifelong A/C. No further EP workup needed.
EP ATTENDING    Agree with above. Start whichever NOAC is covered by his insurance. No further EP workup needed.

## 2017-10-12 NOTE — PROGRESS NOTE ADULT - PROBLEM SELECTOR PLAN 1
pt is doing ok, say his breathing is better  no wheezing and no fever:  he has an abnormal exam not consistent with CHF : will do ct chest non contrast  10/10: doing ok : ct scan chest reviewed: has some opacity on the left side along with bilateral pleural effusion C/W CHF: The left side tumor like opacity seems to be loculated fluid in the minor fissure: ?pseudotumor  10/11: stable : improving albeit slowly  10/12: improved significantly: He is complaining of cough with some yellow phlegm: He is afebrile as well as normal WBC count: would like to avoid any antibiotics : symptomatic treatment
pt is doing ok, say his breathing is better  no wheezing and no fever:  he has an abnormal exam not consistent with CHF : tamela do ct chest non contrast
pt is doing ok, say his breathing is better  no wheezing and no fever:  he has an abnormal exam not consistent with CHF : will do ct chest non contrast  10/10: doing ok : ct scan chest reviewed: has some opacity on the left side along with bilateral pleural effusion C/W CHF: The left side tumor like opacity seems to be loculated fluid in the minor fissure: ?pseudotumor
pt is doing ok, say his breathing is better  no wheezing and no fever:  he has an abnormal exam not consistent with CHF : will do ct chest non contrast  10/10: doing ok : ct scan chest reviewed: has some opacity on the left side along with bilateral pleural effusion C/W CHF: The left side tumor like opacity seems to be loculated fluid in the minor fissure: ?pseudotumor  11/11: stable : improving albeit slowly

## 2017-10-12 NOTE — CHART NOTE - NSCHARTNOTEFT_GEN_A_CORE
Request from Dr Burger  to facilitate patient discharge home today.  Medication reconciliation reviewed, revised, and resolved with Dr Burger who has medically cleared patient for discharge and follow ups as advised.  Patient/family states understanding of discharge instruction and follow ups.  Please refer to discharge note for detailed hospital course.   Medicine NP MANN Rodriguez [phone]

## 2017-10-12 NOTE — PROGRESS NOTE ADULT - SUBJECTIVE AND OBJECTIVE BOX
INTERVAL HPI/OVERNIGHT EVENTS: I feel fine and want to go home.   Vital Signs Last 24 Hrs  T(C): 36.6 (12 Oct 2017 04:11), Max: 36.8 (11 Oct 2017 20:40)  T(F): 97.9 (12 Oct 2017 04:11), Max: 98.3 (11 Oct 2017 20:40)  HR: 80 (12 Oct 2017 04:11) (70 - 86)  BP: 146/74 (12 Oct 2017 04:11) (124/64 - 164/82)  BP(mean): --  RR: 18 (12 Oct 2017 04:11) (18 - 18)  SpO2: 95% (12 Oct 2017 04:11) (95% - 97%)  I&O's Summary    11 Oct 2017 07:01  -  12 Oct 2017 07:00  --------------------------------------------------------  IN: 960 mL / OUT: 0 mL / NET: 960 mL      MEDICATIONS  (STANDING):  amLODIPine   Tablet 10 milliGRAM(s) Oral daily  aspirin enteric coated 81 milliGRAM(s) Oral daily  buDESOnide 160 MICROgram(s)/formoterol 4.5 MICROgram(s) Inhaler 2 Puff(s) Inhalation two times a day  docusate sodium 100 milliGRAM(s) Oral three times a day  furosemide   Injectable 60 milliGRAM(s) IV Push daily  influenza   Vaccine 0.5 milliLiter(s) IntraMuscular once  lisinopril 10 milliGRAM(s) Oral daily  metoprolol 50 milliGRAM(s) Oral two times a day  montelukast 10 milliGRAM(s) Oral daily  rivaroxaban 20 milliGRAM(s) Oral every 24 hours  senna 2 Tablet(s) Oral at bedtime    MEDICATIONS  (PRN):  ALBUTerol/ipratropium for Nebulization 3 milliLiter(s) Nebulizer every 6 hours PRN Shortness of Breath and/or Wheezing    LABS:                        13.9   5.08  )-----------( 213      ( 12 Oct 2017 07:18 )             39.4     10-12    136  |  98  |  21  ----------------------------<  112<H>  3.9   |  27  |  1.15    Ca    9.0      12 Oct 2017 07:17          CAPILLARY BLOOD GLUCOSE              REVIEW OF SYSTEMS:  CONSTITUTIONAL: No fever, weight loss, or fatigue  EYES: No eye pain, visual disturbances, or discharge  ENMT:  No difficulty hearing, tinnitus, vertigo; No sinus or throat pain  NECK: No pain or stiffness  BREASTS: No pain, masses, or nipple discharge  RESPIRATORY: No cough, wheezing, chills or hemoptysis; No shortness of breath  CARDIOVASCULAR: No chest pain, palpitations, dizziness, or leg swelling  GASTROINTESTINAL: No abdominal or epigastric pain. No nausea, vomiting, or hematemesis; No diarrhea or constipation. No melena or hematochezia.  GENITOURINARY: No dysuria, frequency, hematuria, or incontinence  NEUROLOGICAL: No headaches, memory loss, loss of strength, numbness, or tremors  SKIN: No itching, burning, rashes, or lesions   LYMPH NODES: No enlarged glands  ENDOCRINE: No heat or cold intolerance; No hair loss  MUSCULOSKELETAL: No joint pain or swelling; No muscle, back, or extremity pain  PSYCHIATRIC: No depression, anxiety, mood swings, or difficulty sleeping  HEME/LYMPH: No easy bruising, or bleeding gums  ALLERY AND IMMUNOLOGIC: No hives or eczema    RADIOLOGY & ADDITIONAL TESTS:    Consultant(s) Notes Reviewed:  [x ] YES  [ ] NO    PHYSICAL EXAM:  GENERAL: NAD, well-groomed, well-developed, not in any distress ,  HEAD:  Atraumatic, Normocephalic  EYES: EOMI, PERRLA, conjunctiva and sclera clear  ENMT: No tonsillar erythema, exudates, or enlargement; Moist mucous membranes, Good dentition, No lesions  NECK: Supple, No JVD, Normal thyroid  NERVOUS SYSTEM:  Alert & Oriented X3, No focal deficit   CHEST/LUNG: Good air entry bilateral with few basal  rales, rhonchi, wheezing, or rubsops  ABDOMEN: Soft, Nontender, Nondistended; Bowel sounds present  EXTREMITIES:  2+ Peripheral Pulses, No clubbing, cyanosis, or edema  SKIN: No rashes or lesions    Care Discussed with Consultants/Other Providers [ x] YES  [ ] NO

## 2017-10-12 NOTE — PROGRESS NOTE ADULT - ASSESSMENT
83 y/o M pmhx CHF, HTN, HLD, LBBB, old MI, CAD, presenting sent in by his PMD for worsening shortness of breath. Pt states that he has been having worsening shortness of breath with exertion for the last week, today was severe to extent that he was unable to walk more than a few steps without being short of breath. Son states that he took him to his doctor and was told to bring him in for CHF exacerbation. Patient states that he has noted swelling in his legs bilaterally that is worsening for the last few days as well. Took 40mg of lasix this morning, normal dose is 20mg once daily. Reports this has not improved his symptoms. Pt denies any chest pain, fever, chills, nausea, vomiting, diarrhea, recent travel, recent sick contacts.
81 y/o M pmhx CHF, HTN, HLD, LBBB, old MI, CAD, presenting sent in by his PMD for worsening shortness of breath. Pt states that he has been having worsening shortness of breath with exertion for the last week, today was severe to extent that he was unable to walk more than a few steps without being short of breath. Son states that he took him to his doctor and was told to bring him in for CHF exacerbation. Patient states that he has noted swelling in his legs bilaterally that is worsening for the last few days as well. Took 40mg of lasix this morning, normal dose is 20mg once daily. Reports this has not improved his symptoms. Pt denies any chest pain, fever, chills, nausea, vomiting, diarrhea, recent travel, recent sick contacts.     Problem/Plan - 1:  ·  Problem: Acute on chronic systolic congestive heart failure.  Plan: S/P IV Lasix 60 mg and will start 40mg Iv BID . Rpting Echo as last echo showed  EF 34% ;< from: Transthoracic Echocardiogram (08.28.16 @ 18:16) >  1. Mild mitral regurgitation.  2. Calcified trileaflet aortic valve with normal opening.  Mild aortic regurgitation.  3. Endocardium not well visualized; moderately  reduced  left ventricular systolic function, however, endocardial  border definition is limited, and segmental wall-motion  abnormalities assessment is limited.Consider further  limited evaluation with echo contrast Definity for  assessment of segmental wall motion if clinically  indicated.  4. The right ventricle is not well visualized; grossly  normal right ventricular systolic function.  5. Estimated pulmonary artery systolic pressure equals 35  mm Hg, assuming right atrial pressure equals 8 mm Hg,  consistent with borderline pulmonary pressures.    Cardiology consul noted. Repeating Echo and on IV lasix . Awaiting Stress test.      Problem/Plan - 2:  ·  Problem: Chronic atrial fibrillation.  Plan: Stopped taking Eliquis as was making him sick but no bleeding. Started on Lovenox . Will change to Xarelto on Dc  . Echo ordered . EP helping. Rate under  control.      Problem/Plan - 3:  ·  Problem: Hypertension.  Plan: Home  BP meds.      Problem/Plan - 4:  ·  Problem: Coronary artery disease involving native coronary artery of native heart, angina presence unspecified.  Plan: ASA,BB and statin.      Problem/Plan - 5:  ·  Problem: Hyperlipidemia, unspecified hyperlipidemia type.  Plan: Statin.
81 y/o M pmhx CHF, HTN, HLD, LBBB, old MI, CAD, presenting sent in by his PMD for worsening shortness of breath. Pt states that he has been having worsening shortness of breath with exertion for the last week, today was severe to extent that he was unable to walk more than a few steps without being short of breath. Son states that he took him to his doctor and was told to bring him in for CHF exacerbation. Patient states that he has noted swelling in his legs bilaterally that is worsening for the last few days as well. Took 40mg of lasix this morning, normal dose is 20mg once daily. Reports this has not improved his symptoms. Pt denies any chest pain, fever, chills, nausea, vomiting, diarrhea, recent travel, recent sick contacts.
81 y/o M pmhx CHF, HTN, HLD, LBBB, old MI, CAD, presenting sent in by his PMD for worsening shortness of breath. Pt states that he has been having worsening shortness of breath with exertion for the last week, today was severe to extent that he was unable to walk more than a few steps without being short of breath. Son states that he took him to his doctor and was told to bring him in for CHF exacerbation. Patient states that he has noted swelling in his legs bilaterally that is worsening for the last few days as well. Took 40mg of lasix this morning, normal dose is 20mg once daily. Reports this has not improved his symptoms. Pt denies any chest pain, fever, chills, nausea, vomiting, diarrhea, recent travel, recent sick contacts.
81 y/o M pmhx CHF, HTN, HLD, LBBB, old MI, CAD, presenting sent in by his PMD for worsening shortness of breath. Pt states that he has been having worsening shortness of breath with exertion for the last week, today was severe to extent that he was unable to walk more than a few steps without being short of breath. Son states that he took him to his doctor and was told to bring him in for CHF exacerbation. Patient states that he has noted swelling in his legs bilaterally that is worsening for the last few days as well. Took 40mg of lasix this morning, normal dose is 20mg once daily. Reports this has not improved his symptoms. Pt denies any chest pain, fever, chills, nausea, vomiting, diarrhea, recent travel, recent sick contacts.     Problem/Plan - 1:  ·  Problem: Acute on chronic systolic congestive heart failure.  Plan: ON IV Lasix  40mg  BID . Rpting Echo as last echo showed  EF 34% ;< from: Transthoracic Echocardiogram (08.28.16 @ 18:16) >  1. Mild mitral regurgitation.  2. Calcified trileaflet aortic valve with normal opening.  Mild aortic regurgitation.  3. Endocardium not well visualized; moderately  reduced  left ventricular systolic function, however, endocardial  border definition is limited, and segmental wall-motion  abnormalities assessment is limited.Consider further  limited evaluation with echo contrast Definity for  assessment of segmental wall motion if clinically  indicated.  4. The right ventricle is not well visualized; grossly  normal right ventricular systolic function.  5. Estimated pulmonary artery systolic pressure equals 35  mm Hg, assuming right atrial pressure equals 8 mm Hg,  consistent with borderline pulmonary pressures.    Improved with IV Lasix and home on PO Lasix to f/u outpt. Refusing  Stress test.      Problem/Plan - 2:  ·  Problem: Chronic atrial fibrillation.  Plan: Stopped taking Eliquis as was making him sick but no bleeding. Started on Lovenox . Will change to Xarelto on DC  . Echo ordered . EP helping. Rate under  control.      Problem/Plan - 3:  ·  Problem: Hypertension.  Plan: Home  BP meds.      Problem/Plan - 4:  ·  Problem: Coronary artery disease involving native coronary artery of native heart, angina presence unspecified.  Plan: ASA,BB and statin.      Problem/Plan - 5:  ·  Problem: Hyperlipidemia, unspecified hyperlipidemia type.  Plan: Statin.     Medically stable for discharge.
81 y/o M pmhx CHF, HTN, HLD, LBBB, old MI, CAD, presenting sent in by his PMD for worsening shortness of breath. Pt states that he has been having worsening shortness of breath with exertion for the last week, today was severe to extent that he was unable to walk more than a few steps without being short of breath. Son states that he took him to his doctor and was told to bring him in for CHF exacerbation. Patient states that he has noted swelling in his legs bilaterally that is worsening for the last few days as well. Took 40mg of lasix this morning, normal dose is 20mg once daily. Reports this has not improved his symptoms. Pt denies any chest pain, fever, chills, nausea, vomiting, diarrhea, recent travel, recent sick contacts.     Problem/Plan - 1:  ·  Problem: Acute on chronic systolic congestive heart failure.  Plan: S/P IV Lasix 60 mg and will start 40mg Iv BID . Rpting Echo as last echo showed  EF 34% ;< from: Transthoracic Echocardiogram (08.28.16 @ 18:16) >  1. Mild mitral regurgitation.  2. Calcified trileaflet aortic valve with normal opening.  Mild aortic regurgitation.  3. Endocardium not well visualized; moderately  reduced  left ventricular systolic function, however, endocardial  border definition is limited, and segmental wall-motion  abnormalities assessment is limited.Consider further  limited evaluation with echo contrast Definity for  assessment of segmental wall motion if clinically  indicated.  4. The right ventricle is not well visualized; grossly  normal right ventricular systolic function.  5. Estimated pulmonary artery systolic pressure equals 35  mm Hg, assuming right atrial pressure equals 8 mm Hg,  consistent with borderline pulmonary pressures.    Cardiology consul noted. Repeating Echo and on IV lasix      Problem/Plan - 2:  ·  Problem: Chronic atrial fibrillation.  Plan: Stopped taking Eliquis as was making him sick but no bleeding. Started on Lovenox . Will change to Xarelto on Dc  . Echo ordered . EP consulted. Rate under  control.      Problem/Plan - 3:  ·  Problem: Hypertension.  Plan: Home  BP meds.      Problem/Plan - 4:  ·  Problem: Coronary artery disease involving native coronary artery of native heart, angina presence unspecified.  Plan: ASA,BB and statin.      Problem/Plan - 5:  ·  Problem: Hyperlipidemia, unspecified hyperlipidemia type.  Plan: Statin.
81 y/o M pmhx CHF, HTN, HLD, LBBB, old MI, CAD, presenting sent in by his PMD for worsening shortness of breath. Pt states that he has been having worsening shortness of breath with exertion for the last week, today was severe to extent that he was unable to walk more than a few steps without being short of breath. Son states that he took him to his doctor and was told to bring him in for CHF exacerbation. Patient states that he has noted swelling in his legs bilaterally that is worsening for the last few days as well. Took 40mg of lasix this morning, normal dose is 20mg once daily. Reports this has not improved his symptoms. Pt denies any chest pain, fever, chills, nausea, vomiting, diarrhea, recent travel, recent sick contacts.     Problem/Plan - 1:  ·  Problem: Acute on chronic systolic congestive heart failure.  Plan: S/P IV Lasix 60 mg and will start 40mg Iv BID . Rpting Echo as last echo showed  EF 34% ;< from: Transthoracic Echocardiogram (08.28.16 @ 18:16) >  1. Mild mitral regurgitation.  2. Calcified trileaflet aortic valve with normal opening.  Mild aortic regurgitation.  3. Endocardium not well visualized; moderately  reduced  left ventricular systolic function, however, endocardial  border definition is limited, and segmental wall-motion  abnormalities assessment is limited.Consider further  limited evaluation with echo contrast Definity for  assessment of segmental wall motion if clinically  indicated.  4. The right ventricle is not well visualized; grossly  normal right ventricular systolic function.  5. Estimated pulmonary artery systolic pressure equals 35  mm Hg, assuming right atrial pressure equals 8 mm Hg,  consistent with borderline pulmonary pressures.    Cardiology consulted. Repating Echo and on IV lasix      Problem/Plan - 2:  ·  Problem: Chronic atrial fibrillation.  Plan: Stopped taking Eliquis as was making him sick but no bleeding. started on Lovenox . Will change to Xarelto on Dc  . Echo ordered . EP consulted. Rate under  control.      Problem/Plan - 3:  ·  Problem: Hypertension.  Plan: Home  BP meds.      Problem/Plan - 4:  ·  Problem: Coronary artery disease involving native coronary artery of native heart, angina presence unspecified.  Plan: ASA,BB and statin.      Problem/Plan - 5:  ·  Problem: Hyperlipidemia, unspecified hyperlipidemia type.  Plan: Statin.
83 y/o M pmhx CHF, HTN, HLD, LBBB, old MI, CAD, presenting sent in by his PMD for worsening shortness of breath. Pt states that he has been having worsening shortness of breath with exertion for the last week, today was severe to extent that he was unable to walk more than a few steps without being short of breath. Son states that he took him to his doctor and was told to bring him in for CHF exacerbation. Patient states that he has noted swelling in his legs bilaterally that is worsening for the last few days as well. Took 40mg of lasix this morning, normal dose is 20mg once daily. Reports this has not improved his symptoms. Pt denies any chest pain, fever, chills, nausea, vomiting, diarrhea, recent travel, recent sick contacts.
83 y/o M pmhx CHF, HTN, HLD, LBBB, old MI, CAD, presenting sent in by his PMD for worsening shortness of breath. Pt states that he has been having worsening shortness of breath with exertion for the last week, today was severe to extent that he was unable to walk more than a few steps without being short of breath. Son states that he took him to his doctor and was told to bring him in for CHF exacerbation. Patient states that he has noted swelling in his legs bilaterally that is worsening for the last few days as well. Took 40mg of lasix this morning, normal dose is 20mg once daily. Reports this has not improved his symptoms. Pt denies any chest pain, fever, chills, nausea, vomiting, diarrhea, recent travel, recent sick contacts.     Problem/Plan - 1:  ·  Problem: Acute on chronic systolic congestive heart failure.  Plan: ON IV Lasix  40mg  BID . Rpting Echo as last echo showed  EF 34% ;< from: Transthoracic Echocardiogram (08.28.16 @ 18:16) >  1. Mild mitral regurgitation.  2. Calcified trileaflet aortic valve with normal opening.  Mild aortic regurgitation.  3. Endocardium not well visualized; moderately  reduced  left ventricular systolic function, however, endocardial  border definition is limited, and segmental wall-motion  abnormalities assessment is limited.Consider further  limited evaluation with echo contrast Definity for  assessment of segmental wall motion if clinically  indicated.  4. The right ventricle is not well visualized; grossly  normal right ventricular systolic function.  5. Estimated pulmonary artery systolic pressure equals 35  mm Hg, assuming right atrial pressure equals 8 mm Hg,  consistent with borderline pulmonary pressures.    Cardiology consul noted. Repeating Echo and on IV lasix . Refused Stress test.      Problem/Plan - 2:  ·  Problem: Chronic atrial fibrillation.  Plan: Stopped taking Eliquis as was making him sick but no bleeding. Started on Lovenox . Will change to Xarelto on Dc  . Echo ordered . EP helping. Rate under  control.      Problem/Plan - 3:  ·  Problem: Hypertension.  Plan: Home  BP meds.      Problem/Plan - 4:  ·  Problem: Coronary artery disease involving native coronary artery of native heart, angina presence unspecified.  Plan: ASA,BB and statin.      Problem/Plan - 5:  ·  Problem: Hyperlipidemia, unspecified hyperlipidemia type.  Plan: Statin.     medically stable for discharge.

## 2017-10-12 NOTE — PROGRESS NOTE ADULT - PROBLEM SELECTOR PROBLEM 2
Acute on chronic systolic congestive heart failure

## 2017-10-12 NOTE — PROGRESS NOTE ADULT - SUBJECTIVE AND OBJECTIVE BOX
No angina no palpations no syncope     MEDICATIONS  (STANDING):  amLODIPine   Tablet 10 milliGRAM(s) Oral daily  aspirin enteric coated 81 milliGRAM(s) Oral daily  buDESOnide 160 MICROgram(s)/formoterol 4.5 MICROgram(s) Inhaler 2 Puff(s) Inhalation two times a day  docusate sodium 100 milliGRAM(s) Oral three times a day  furosemide   Injectable 60 milliGRAM(s) IV Push daily  influenza   Vaccine 0.5 milliLiter(s) IntraMuscular once  lisinopril 10 milliGRAM(s) Oral daily  metoprolol 50 milliGRAM(s) Oral two times a day  montelukast 10 milliGRAM(s) Oral daily  rivaroxaban 20 milliGRAM(s) Oral every 24 hours  senna 2 Tablet(s) Oral at bedtime    MEDICATIONS  (PRN):  ALBUTerol/ipratropium for Nebulization 3 milliLiter(s) Nebulizer every 6 hours PRN Shortness of Breath and/or Wheezing      LABS:                        13.9   5.08  )-----------( 213      ( 12 Oct 2017 07:18 )             39.4     Hemoglobin: 13.9 g/dL (10-12 @ 07:18)  Hemoglobin: 14.5 g/dL (10-11 @ 07:04)  Hemoglobin: 14.5 g/dL (10-08 @ 09:28)  Hemoglobin: 14.6 g/dL (10-07 @ 16:25)    10-12    136  |  98  |  21  ----------------------------<  112<H>  3.9   |  27  |  1.15    Ca    9.0      12 Oct 2017 07:17      Creatinine Trend: 1.15<--, 1.06<--, 1.29<--, 1.10<--, 1.05<--, 0.97<--     CARDIAC MARKERS ( 10 Oct 2017 14:46 )  x     / <0.01 ng/mL / 35 U/L / x     / 2.3 ng/mL        PHYSICAL EXAM  Vital Signs Last 24 Hrs  T(C): 36.6 (12 Oct 2017 04:11), Max: 36.8 (11 Oct 2017 20:40)  T(F): 97.9 (12 Oct 2017 04:11), Max: 98.3 (11 Oct 2017 20:40)  HR: 80 (12 Oct 2017 04:11) (70 - 86)  BP: 146/74 (12 Oct 2017 04:11) (124/64 - 164/82)  BP(mean): --  RR: 18 (12 Oct 2017 04:11) (18 - 18)  SpO2: 95% (12 Oct 2017 04:11) (95% - 97%)      	  	    Cardiovascular: Normal S1 S2, No JVD, No murmurs , Peripheral pulses palpable 2+ bilaterally  Respiratory: Lungs clear to auscultation, normal effort 	  Gastrointestinal:  Soft, Non-tender, + BS  ectremities no clubbing, cyanosis, Edema B/L LE's 	      TELEMETRY: 	  NSR     DIAGNOSTIC TESTING:  [ ] Echocardiogram: < from: Transthoracic Echocardiogram (10.09.17 @ 19:38) >  Conclusions:  1. Mildly dilated left atrium.  LA volume index = 38 cc/m2.  2. Normal left ventricular internal dimensions and wall  thicknesses.  3. Normal left ventricular systolic function. Septal motion  is consistent with LBBB.  ------------------------------------------------------------------------  Confirmed on  10/9/2017 - 16:26:08 by Kade Fernández M.D.    < end of copied text >    [ ]  Catheterization:  [ ] Stress Test:    OTHER: 	        ASSESSMENT/PLAN: 	82y Male hx of LBBB, GERD, Hernia repair, HTN< chol, PAF now dyspnea / chf    - A/C with xarelto - recommend lifelong A/C given elevated chads-vasc score  - rate control with BB- tele stable  -cont Diuresis per cards  -no further EPS work up needed at present

## 2017-10-14 PROBLEM — I50.9 HEART FAILURE, UNSPECIFIED: Chronic | Status: ACTIVE | Noted: 2017-10-07

## 2017-12-19 PROCEDURE — 82553 CREATINE MB FRACTION: CPT

## 2017-12-19 PROCEDURE — 93970 EXTREMITY STUDY: CPT

## 2017-12-19 PROCEDURE — 83605 ASSAY OF LACTIC ACID: CPT

## 2017-12-19 PROCEDURE — 84295 ASSAY OF SERUM SODIUM: CPT

## 2017-12-19 PROCEDURE — 93306 TTE W/DOPPLER COMPLETE: CPT

## 2017-12-19 PROCEDURE — 82803 BLOOD GASES ANY COMBINATION: CPT

## 2017-12-19 PROCEDURE — 85610 PROTHROMBIN TIME: CPT

## 2017-12-19 PROCEDURE — 80053 COMPREHEN METABOLIC PANEL: CPT

## 2017-12-19 PROCEDURE — 84484 ASSAY OF TROPONIN QUANT: CPT

## 2017-12-19 PROCEDURE — 96374 THER/PROPH/DIAG INJ IV PUSH: CPT

## 2017-12-19 PROCEDURE — 85014 HEMATOCRIT: CPT

## 2017-12-19 PROCEDURE — 84132 ASSAY OF SERUM POTASSIUM: CPT

## 2017-12-19 PROCEDURE — 94640 AIRWAY INHALATION TREATMENT: CPT

## 2017-12-19 PROCEDURE — 71045 X-RAY EXAM CHEST 1 VIEW: CPT

## 2017-12-19 PROCEDURE — 71250 CT THORAX DX C-: CPT

## 2017-12-19 PROCEDURE — 82435 ASSAY OF BLOOD CHLORIDE: CPT

## 2017-12-19 PROCEDURE — 82550 ASSAY OF CK (CPK): CPT

## 2017-12-19 PROCEDURE — 85027 COMPLETE CBC AUTOMATED: CPT

## 2017-12-19 PROCEDURE — 80048 BASIC METABOLIC PNL TOTAL CA: CPT

## 2017-12-19 PROCEDURE — 82330 ASSAY OF CALCIUM: CPT

## 2017-12-19 PROCEDURE — 85730 THROMBOPLASTIN TIME PARTIAL: CPT

## 2017-12-19 PROCEDURE — 99285 EMERGENCY DEPT VISIT HI MDM: CPT | Mod: 25

## 2017-12-19 PROCEDURE — 93005 ELECTROCARDIOGRAM TRACING: CPT

## 2017-12-19 PROCEDURE — 97161 PT EVAL LOW COMPLEX 20 MIN: CPT

## 2017-12-19 PROCEDURE — 82947 ASSAY GLUCOSE BLOOD QUANT: CPT

## 2017-12-19 PROCEDURE — 83880 ASSAY OF NATRIURETIC PEPTIDE: CPT

## 2018-07-02 ENCOUNTER — APPOINTMENT (OUTPATIENT)
Dept: ORTHOPEDIC SURGERY | Facility: CLINIC | Age: 83
End: 2018-07-02
Payer: MEDICARE

## 2018-07-02 DIAGNOSIS — M17.12 UNILATERAL PRIMARY OSTEOARTHRITIS, LEFT KNEE: ICD-10-CM

## 2018-07-02 PROCEDURE — 99213 OFFICE O/P EST LOW 20 MIN: CPT

## 2018-12-08 NOTE — PATIENT PROFILE ADULT. - URINARY CATHETER
Telephone Encounter by Beronica Boggs at 05/11/17 09:55 AM     Author:  Beronica Boggs Service:  (none) Author Type:       Filed:  05/11/17 09:56 AM Encounter Date:  8/11/2016 Status:  Signed     :  Beronica Boggs ()            Patient already has an appointment set up in 6/17.[SP1.1M]      Revision History        User Key Date/Time User Provider Type Action    > SP1.1 05/11/17 09:56 AM Beronica Boggs  Sign    M - Manual             no
